# Patient Record
Sex: MALE | Race: WHITE | NOT HISPANIC OR LATINO | Employment: FULL TIME | URBAN - METROPOLITAN AREA
[De-identification: names, ages, dates, MRNs, and addresses within clinical notes are randomized per-mention and may not be internally consistent; named-entity substitution may affect disease eponyms.]

---

## 2017-03-23 ENCOUNTER — OFFICE VISIT - HEALTHEAST (OUTPATIENT)
Dept: FAMILY MEDICINE | Facility: CLINIC | Age: 57
End: 2017-03-23

## 2017-03-23 ENCOUNTER — COMMUNICATION - HEALTHEAST (OUTPATIENT)
Dept: FAMILY MEDICINE | Facility: CLINIC | Age: 57
End: 2017-03-23

## 2017-03-23 DIAGNOSIS — J32.9 RHINOSINUSITIS: ICD-10-CM

## 2017-03-23 ASSESSMENT — MIFFLIN-ST. JEOR: SCORE: 1824.16

## 2018-03-01 ENCOUNTER — OFFICE VISIT - HEALTHEAST (OUTPATIENT)
Dept: FAMILY MEDICINE | Facility: CLINIC | Age: 58
End: 2018-03-01

## 2018-03-01 DIAGNOSIS — Z00.00 ANNUAL PHYSICAL EXAM: ICD-10-CM

## 2018-03-01 DIAGNOSIS — H93.13 TINNITUS OF BOTH EARS: ICD-10-CM

## 2018-03-01 LAB
ALBUMIN SERPL-MCNC: 4.1 G/DL (ref 3.5–5)
ALP SERPL-CCNC: 48 U/L (ref 45–120)
ALT SERPL W P-5'-P-CCNC: 36 U/L (ref 0–45)
ANION GAP SERPL CALCULATED.3IONS-SCNC: 10 MMOL/L (ref 5–18)
AST SERPL W P-5'-P-CCNC: 24 U/L (ref 0–40)
BILIRUB SERPL-MCNC: 1.3 MG/DL (ref 0–1)
BUN SERPL-MCNC: 20 MG/DL (ref 8–22)
CALCIUM SERPL-MCNC: 9.7 MG/DL (ref 8.5–10.5)
CHLORIDE BLD-SCNC: 102 MMOL/L (ref 98–107)
CHOLEST SERPL-MCNC: 192 MG/DL
CO2 SERPL-SCNC: 26 MMOL/L (ref 22–31)
CREAT SERPL-MCNC: 1.07 MG/DL (ref 0.7–1.3)
FASTING STATUS PATIENT QL REPORTED: YES
GFR SERPL CREATININE-BSD FRML MDRD: >60 ML/MIN/1.73M2
GLUCOSE BLD-MCNC: 101 MG/DL (ref 70–125)
HDLC SERPL-MCNC: 54 MG/DL
LDLC SERPL CALC-MCNC: 114 MG/DL
POTASSIUM BLD-SCNC: 4.5 MMOL/L (ref 3.5–5)
PROT SERPL-MCNC: 7.7 G/DL (ref 6–8)
PSA SERPL-MCNC: 2.5 NG/ML (ref 0–3.5)
SODIUM SERPL-SCNC: 138 MMOL/L (ref 136–145)
TRIGL SERPL-MCNC: 119 MG/DL
TSH SERPL DL<=0.005 MIU/L-ACNC: 2.03 UIU/ML (ref 0.3–5)

## 2018-03-01 ASSESSMENT — MIFFLIN-ST. JEOR: SCORE: 1812.37

## 2018-03-02 LAB — 25(OH)D3 SERPL-MCNC: 45.4 NG/ML (ref 30–80)

## 2018-03-05 ENCOUNTER — COMMUNICATION - HEALTHEAST (OUTPATIENT)
Dept: FAMILY MEDICINE | Facility: CLINIC | Age: 58
End: 2018-03-05

## 2018-10-23 ENCOUNTER — COMMUNICATION - HEALTHEAST (OUTPATIENT)
Dept: SCHEDULING | Facility: CLINIC | Age: 58
End: 2018-10-23

## 2018-10-25 ENCOUNTER — OFFICE VISIT - HEALTHEAST (OUTPATIENT)
Dept: FAMILY MEDICINE | Facility: CLINIC | Age: 58
End: 2018-10-25

## 2018-10-25 DIAGNOSIS — R07.9 ACUTE CHEST PAIN: ICD-10-CM

## 2018-10-25 LAB
ALBUMIN SERPL-MCNC: 4.2 G/DL (ref 3.5–5)
ALP SERPL-CCNC: 48 U/L (ref 45–120)
ALT SERPL W P-5'-P-CCNC: 32 U/L (ref 0–45)
ANION GAP SERPL CALCULATED.3IONS-SCNC: 10 MMOL/L (ref 5–18)
AST SERPL W P-5'-P-CCNC: 20 U/L (ref 0–40)
BILIRUB SERPL-MCNC: 1.7 MG/DL (ref 0–1)
BUN SERPL-MCNC: 15 MG/DL (ref 8–22)
CALCIUM SERPL-MCNC: 9.8 MG/DL (ref 8.5–10.5)
CHLORIDE BLD-SCNC: 101 MMOL/L (ref 98–107)
CK-MB: 2 NG/ML (ref 0–7)
CO2 SERPL-SCNC: 27 MMOL/L (ref 22–31)
CREAT SERPL-MCNC: 1.15 MG/DL (ref 0.7–1.3)
D DIMER PPP FEU-MCNC: <=0.27 FEU UG/ML
GFR SERPL CREATININE-BSD FRML MDRD: >60 ML/MIN/1.73M2
GLUCOSE BLD-MCNC: 95 MG/DL (ref 70–125)
POTASSIUM BLD-SCNC: 4 MMOL/L (ref 3.5–5)
PROT SERPL-MCNC: 7.3 G/DL (ref 6–8)
SODIUM SERPL-SCNC: 138 MMOL/L (ref 136–145)
TROPONIN I SERPL-MCNC: <0.01 NG/ML (ref 0–0.29)

## 2018-10-29 ENCOUNTER — COMMUNICATION - HEALTHEAST (OUTPATIENT)
Dept: FAMILY MEDICINE | Facility: CLINIC | Age: 58
End: 2018-10-29

## 2018-10-30 LAB
ATRIAL RATE - MUSE: 75 BPM
DIASTOLIC BLOOD PRESSURE - MUSE: NORMAL MMHG
INTERPRETATION ECG - MUSE: NORMAL
P AXIS - MUSE: 38 DEGREES
PR INTERVAL - MUSE: 172 MS
QRS DURATION - MUSE: 80 MS
QT - MUSE: 360 MS
QTC - MUSE: 402 MS
R AXIS - MUSE: -10 DEGREES
SYSTOLIC BLOOD PRESSURE - MUSE: NORMAL MMHG
T AXIS - MUSE: 21 DEGREES
VENTRICULAR RATE- MUSE: 75 BPM

## 2019-07-03 ENCOUNTER — COMMUNICATION - HEALTHEAST (OUTPATIENT)
Dept: TELEHEALTH | Facility: CLINIC | Age: 59
End: 2019-07-03

## 2019-07-03 ENCOUNTER — OFFICE VISIT - HEALTHEAST (OUTPATIENT)
Dept: FAMILY MEDICINE | Facility: CLINIC | Age: 59
End: 2019-07-03

## 2019-07-03 DIAGNOSIS — M54.42 ACUTE LEFT-SIDED LOW BACK PAIN WITH LEFT-SIDED SCIATICA: ICD-10-CM

## 2019-07-15 ENCOUNTER — RECORDS - HEALTHEAST (OUTPATIENT)
Dept: ADMINISTRATIVE | Facility: OTHER | Age: 59
End: 2019-07-15

## 2019-09-12 ENCOUNTER — RECORDS - HEALTHEAST (OUTPATIENT)
Dept: ADMINISTRATIVE | Facility: OTHER | Age: 59
End: 2019-09-12

## 2019-09-23 ENCOUNTER — RECORDS - HEALTHEAST (OUTPATIENT)
Dept: ADMINISTRATIVE | Facility: OTHER | Age: 59
End: 2019-09-23

## 2019-10-17 ENCOUNTER — RECORDS - HEALTHEAST (OUTPATIENT)
Dept: ADMINISTRATIVE | Facility: OTHER | Age: 59
End: 2019-10-17

## 2019-11-18 ENCOUNTER — RECORDS - HEALTHEAST (OUTPATIENT)
Dept: ADMINISTRATIVE | Facility: OTHER | Age: 59
End: 2019-11-18

## 2019-12-19 ENCOUNTER — RECORDS - HEALTHEAST (OUTPATIENT)
Dept: ADMINISTRATIVE | Facility: OTHER | Age: 59
End: 2019-12-19

## 2020-01-06 ENCOUNTER — OFFICE VISIT - HEALTHEAST (OUTPATIENT)
Dept: FAMILY MEDICINE | Facility: CLINIC | Age: 60
End: 2020-01-06

## 2020-01-06 ENCOUNTER — COMMUNICATION - HEALTHEAST (OUTPATIENT)
Dept: FAMILY MEDICINE | Facility: CLINIC | Age: 60
End: 2020-01-06

## 2020-01-06 DIAGNOSIS — Z00.00 WELLNESS EXAMINATION: ICD-10-CM

## 2020-01-06 DIAGNOSIS — R03.0 ELEVATED BLOOD PRESSURE READING WITHOUT DIAGNOSIS OF HYPERTENSION: ICD-10-CM

## 2020-01-06 LAB
ANION GAP SERPL CALCULATED.3IONS-SCNC: 8 MMOL/L (ref 5–18)
BUN SERPL-MCNC: 17 MG/DL (ref 8–22)
CALCIUM SERPL-MCNC: 9.8 MG/DL (ref 8.5–10.5)
CHLORIDE BLD-SCNC: 106 MMOL/L (ref 98–107)
CHOLEST SERPL-MCNC: 201 MG/DL
CO2 SERPL-SCNC: 27 MMOL/L (ref 22–31)
CREAT SERPL-MCNC: 1.12 MG/DL (ref 0.7–1.3)
ERYTHROCYTE [DISTWIDTH] IN BLOOD BY AUTOMATED COUNT: 11.6 % (ref 11–14.5)
FASTING STATUS PATIENT QL REPORTED: YES
GFR SERPL CREATININE-BSD FRML MDRD: >60 ML/MIN/1.73M2
GLUCOSE BLD-MCNC: 102 MG/DL (ref 70–125)
HBA1C MFR BLD: 5.4 % (ref 3.5–6)
HCT VFR BLD AUTO: 46.1 % (ref 40–54)
HDLC SERPL-MCNC: 52 MG/DL
HGB BLD-MCNC: 15.6 G/DL (ref 14–18)
LDLC SERPL CALC-MCNC: 122 MG/DL
MCH RBC QN AUTO: 31.4 PG (ref 27–34)
MCHC RBC AUTO-ENTMCNC: 33.8 G/DL (ref 32–36)
MCV RBC AUTO: 93 FL (ref 80–100)
PLATELET # BLD AUTO: 223 THOU/UL (ref 140–440)
PMV BLD AUTO: 7.6 FL (ref 7–10)
POTASSIUM BLD-SCNC: 4.4 MMOL/L (ref 3.5–5)
PSA SERPL-MCNC: 2.8 NG/ML (ref 0–3.5)
RBC # BLD AUTO: 4.97 MILL/UL (ref 4.4–6.2)
SODIUM SERPL-SCNC: 141 MMOL/L (ref 136–145)
TRIGL SERPL-MCNC: 137 MG/DL
WBC: 5 THOU/UL (ref 4–11)

## 2020-01-06 ASSESSMENT — MIFFLIN-ST. JEOR: SCORE: 1771.43

## 2020-02-10 ENCOUNTER — COMMUNICATION - HEALTHEAST (OUTPATIENT)
Dept: TELEHEALTH | Facility: CLINIC | Age: 60
End: 2020-02-10

## 2020-02-10 ENCOUNTER — OFFICE VISIT - HEALTHEAST (OUTPATIENT)
Dept: FAMILY MEDICINE | Facility: CLINIC | Age: 60
End: 2020-02-10

## 2020-02-10 DIAGNOSIS — H01.001 BLEPHARITIS OF RIGHT UPPER EYELID, UNSPECIFIED TYPE: ICD-10-CM

## 2020-02-10 RX ORDER — CHLORAL HYDRATE 500 MG
2 CAPSULE ORAL DAILY
Status: SHIPPED | COMMUNITY
Start: 2020-02-10 | End: 2022-11-23

## 2020-02-12 ENCOUNTER — RECORDS - HEALTHEAST (OUTPATIENT)
Dept: ADMINISTRATIVE | Facility: OTHER | Age: 60
End: 2020-02-12

## 2021-02-22 ENCOUNTER — OFFICE VISIT - HEALTHEAST (OUTPATIENT)
Dept: FAMILY MEDICINE | Facility: CLINIC | Age: 61
End: 2021-02-22

## 2021-02-22 ENCOUNTER — TRANSFERRED RECORDS (OUTPATIENT)
Dept: HEALTH INFORMATION MANAGEMENT | Facility: CLINIC | Age: 61
End: 2021-02-22

## 2021-02-22 DIAGNOSIS — R03.0 ELEVATED BLOOD PRESSURE READING WITHOUT DIAGNOSIS OF HYPERTENSION: ICD-10-CM

## 2021-02-22 DIAGNOSIS — R07.9 ACUTE CHEST PAIN: ICD-10-CM

## 2021-02-22 DIAGNOSIS — B02.9 HERPES ZOSTER WITHOUT COMPLICATION: ICD-10-CM

## 2021-02-22 LAB
ATRIAL RATE - MUSE: 70 BPM
DIASTOLIC BLOOD PRESSURE - MUSE: NORMAL
INTERPRETATION ECG - MUSE: NORMAL
P AXIS - MUSE: 33 DEGREES
PR INTERVAL - MUSE: 174 MS
QRS DURATION - MUSE: 82 MS
QT - MUSE: 374 MS
QTC - MUSE: 412 MS
R AXIS - MUSE: -9 DEGREES
SYSTOLIC BLOOD PRESSURE - MUSE: NORMAL
T AXIS - MUSE: 5 DEGREES
VENTRICULAR RATE- MUSE: 73 BPM

## 2021-02-22 RX ORDER — VALACYCLOVIR HYDROCHLORIDE 1 G/1
1000 TABLET, FILM COATED ORAL 3 TIMES DAILY
Qty: 21 TABLET | Refills: 0 | Status: SHIPPED | OUTPATIENT
Start: 2021-02-22 | End: 2021-07-21

## 2021-05-30 VITALS — HEIGHT: 71 IN | WEIGHT: 215.8 LBS | BODY MASS INDEX: 30.21 KG/M2

## 2021-05-30 NOTE — PROGRESS NOTES
"Assessment:   1. Acute left-sided low back pain with left-sided sciatica   Sciatica possibly due to intervertebral disc herniation at L4-5   - Ambulatory referral to Orthopedics     Plan:   Natural history and expected course discussed. Questions answered.  Proper lifting, bending technique discussed.  Stretching exercises discussed.  Orthopedic referral due to possible sciatica.  Follow-up in 4 weeks.     Subjective:   Dashawn Yeung is a 58 y.o. male who presents for evaluation of low back pain. The patient has had no prior back problems. Symptoms have been present for 4 months and are gradually worsening.  Onset was related to / precipitated by a twisting movement. The pain is located in the left lumbar area or left sacroiliac area and radiates to the left hip, left thigh, left lower leg, left foot. The pain is described as aching and occurs all day. He rates his pain as a 3 on a scale of 0-10. Symptoms are exacerbated by extension, straining and twisting. Symptoms are improved by nothing. He has also tried rest which provided no symptom relief. He has no other symptoms associated with the back pain. The patient has no \"red flag\" history indicative of complicated back pain.    The following portions of the patient's history were reviewed and updated as appropriate: allergies, current medications, past family history, past medical history, past social history, past surgical history and problem list.    Review of Systems  Pertinent items are noted in HPI.      Objective:    Full range of motion without pain, no tenderness, no spasm, no curvature.  Normal reflexes, gait, strength and negative straight-leg raise.  Inspection and palpation: inspection of back is normal.  Muscle tone and ROM exam: muscle tone normal without spasm.  Straight leg raise: positive at 30 degrees on the left.  Neurological: right knee reflex normal, left knee reflex increased.       "

## 2021-05-31 VITALS — WEIGHT: 213.9 LBS | HEIGHT: 71 IN | BODY MASS INDEX: 29.94 KG/M2

## 2021-06-02 VITALS — BODY MASS INDEX: 30.06 KG/M2 | WEIGHT: 215.5 LBS

## 2021-06-03 VITALS — BODY MASS INDEX: 29.09 KG/M2 | WEIGHT: 208.56 LBS

## 2021-06-03 VITALS
OXYGEN SATURATION: 97 % | BODY MASS INDEX: 28.56 KG/M2 | HEIGHT: 71 IN | SYSTOLIC BLOOD PRESSURE: 130 MMHG | TEMPERATURE: 97.9 F | DIASTOLIC BLOOD PRESSURE: 100 MMHG | WEIGHT: 204 LBS | HEART RATE: 72 BPM

## 2021-06-04 VITALS
HEART RATE: 75 BPM | SYSTOLIC BLOOD PRESSURE: 122 MMHG | TEMPERATURE: 97.8 F | WEIGHT: 209.1 LBS | BODY MASS INDEX: 28.96 KG/M2 | OXYGEN SATURATION: 98 % | DIASTOLIC BLOOD PRESSURE: 78 MMHG

## 2021-06-04 NOTE — PROGRESS NOTES
Assessment:      Healthy male exam.      Plan:    1. Wellness examination  Encourage exercise, weight loss, baby aspirin, fish oil, updated immunizations, reviewed colonoscopy.  - HM2(CBC w/o Differential)  - Basic Metabolic Panel  - PSA (Prostatic-Specific Antigen), Annual Screen  - Glycosylated Hemoglobin A1c  - Lipid Cascade    2. Elevated blood pressure reading without diagnosis of hypertension  We discussed hypertension in detail.  Recommended weight loss and monitoring.  Offered medications but he would like to hold on this at this time.  He will monitor his blood pressures and if trending above 140/90 we will visit early.    RTC 1 year     All questions answered.     Subjective:      Dashawn Yeung is a 59 y.o. male who presents for an annual exam.  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  He notes that he takes his blood pressure at home and his blood pressure runs in the 148/90 range consistently.  He has past history of back injections in the L5 region.    Healthy Habits:   Regular Exercise: Yes  Sunscreen Use: Yes  Healthy Diet: No  Dental Visits Regularly: Yes  Seat Belt: Yes  Sexually active: Yes  Monthly Self Testicular Exams:  Yes  Hemoccults: No  Flex Sig: No  Colonoscopy: Yes  Lipid Profile: Yes  Glucose Screen: No  Prevention of Osteoporosis: Takes a calcium and a multivitamin.   Last Dexa: No  Guns at Home:  No      Immunization History   Administered Date(s) Administered     Influenza, inj, historic,unspecified 10/21/2019     Immunization status: up to date and documented, due today.    No exam data present    Current Outpatient Medications   Medication Sig Dispense Refill     aspirin 81 MG EC tablet Take 81 mg by mouth daily.       calcium carbonate-vitamin D2 500 mg(1,250mg) -200 unit tablet Take 1 tablet by mouth 2 (two) times a day.       multivitamin therapeutic (THERAGRAN) tablet Take 1 tablet by mouth daily.       No current  facility-administered medications for this visit.      No past medical history on file.  Past Surgical History:   Procedure Laterality Date     APPENDECTOMY       Patient has no known allergies.  Family History   Problem Relation Age of Onset     Stroke Father      Social History     Socioeconomic History     Marital status:      Spouse name: Not on file     Number of children: Not on file     Years of education: Not on file     Highest education level: Not on file   Occupational History     Not on file   Social Needs     Financial resource strain: Not on file     Food insecurity:     Worry: Not on file     Inability: Not on file     Transportation needs:     Medical: Not on file     Non-medical: Not on file   Tobacco Use     Smoking status: Never Smoker     Smokeless tobacco: Never Used   Substance and Sexual Activity     Alcohol use: Yes     Alcohol/week: 7.0 standard drinks     Types: 4 Glasses of wine, 3 Cans of beer per week     Drug use: No     Sexual activity: Yes     Partners: Female     Birth control/protection: None   Lifestyle     Physical activity:     Days per week: Not on file     Minutes per session: Not on file     Stress: Not on file   Relationships     Social connections:     Talks on phone: Not on file     Gets together: Not on file     Attends Hindu service: Not on file     Active member of club or organization: Not on file     Attends meetings of clubs or organizations: Not on file     Relationship status: Not on file     Intimate partner violence:     Fear of current or ex partner: Not on file     Emotionally abused: Not on file     Physically abused: Not on file     Forced sexual activity: Not on file   Other Topics Concern     Not on file   Social History Narrative     Not on file       Review of Systems  General:  Denies problem  Eyes: Denies problem  Ears/Nose/Throat: Denies problem  Cardiovascular: Denies problem  Respiratory:  Denies problem  Gastrointestinal:  Denies  "problem  Genitourinary: Denies problem  Musculoskeletal:  Denies problem  Skin: Denies problem  Neurologic: Denies problem  Psychiatric: Denies problem  Endocrine: Denies problem  Heme/Lymphatic: Denies problem   Allergic/Immunologic: Denies problem        Objective:     Vitals:    01/06/20 0756   BP: (!) 146/96   Pulse: 72   Temp: 97.9  F (36.6  C)   TempSrc: Oral   SpO2: 97%   Weight: 204 lb (92.5 kg)   Height: 5' 11.25\" (1.81 m)     Body mass index is 28.25 kg/m .    Physical  General Appearance: Alert, cooperative, no distress, appears stated age  Head: Normocephalic, without obvious abnormality, atraumatic  Eyes: PERRL, conjunctiva/corneas clear, EOM's intact  Ears: Normal TM's and external ear canals, both ears  Nose: Nares normal, septum midline,mucosa normal, no drainage  Throat: Lips, mucosa, and tongue normal; teeth and gums normal  Neck: Supple, symmetrical, trachea midline, no adenopathy;  thyroid: not enlarged, symmetric, no tenderness/mass/nodules; no carotid bruit or JVD  Back: Symmetric, no curvature, ROM normal, no CVA tenderness  Lungs: Clear to auscultation bilaterally, respirations unlabored  Heart: Regular rate and rhythm, S1 and S2 normal, no murmur, rub, or gallop,  Abdomen: Soft, non-tender, bowel sounds active all four quadrants,  no masses, no organomegaly  Genitourinary: Penis normal. Right testis is descended. Left testis is descended.   Musculoskeletal: Normal range of motion. No joint swelling or deformity.   Extremities: Extremities normal, atraumatic, no cyanosis or edema  Skin: Skin color, texture, turgor normal, no rashes or lesions  Lymph nodes: Cervical, supraclavicular, and axillary nodes normal  Neurologic: He is alert. He has normal reflexes.   Psychiatric: He has a normal mood and affect.            "

## 2021-06-05 VITALS
HEART RATE: 75 BPM | OXYGEN SATURATION: 98 % | WEIGHT: 207 LBS | BODY MASS INDEX: 28.67 KG/M2 | DIASTOLIC BLOOD PRESSURE: 80 MMHG | SYSTOLIC BLOOD PRESSURE: 142 MMHG

## 2021-06-09 NOTE — PROGRESS NOTES
ASSESSMENT:  1. Rhinosinusitis  - azithromycin (ZITHROMAX Z-TEE) 250 MG tablet; Take 2 tablets (500 mg) on  Day 1,  followed by 1 tablet (250 mg) once daily on Days 2 through 5.  Dispense: 6 tablet; Refill: 0  - bacitracin-neomycin-polymyxin b-hydrocortisone (CORTISPORIN) 1 % ointment; Apply topically 2 (two) times a day.  Dispense: 15 g; Refill: 0    PLAN:  Patient Instructions   Take two pills of the Z-pack today and for the remaining days take one. Perform nasal rinses, and take around 10 mg of anti-histamines like Claritin. Nasal saline is available over the counter. Take ibuprofen for pain as needed. Also if your glasses continue to bother you, try the cotton balls.       No orders of the defined types were placed in this encounter.    There are no discontinued medications.    No Follow-up on file.    CHIEF COMPLAINT:  Chief Complaint   Patient presents with     [facility] [place]     [place]     swollen lymph node-sinus congestion-painful ear       HISTORY OF PRESENT ILLNESS:  Dashawn is a 56 y.o. male presenting to the clinic today to establish care.     Ear Pain: His wife shaves his head once a week fairly regularly, and last week after his hair was shaved he noticed what looked like razor burn. Around the same time the left side of his head near his ear appears swollen. He first noticed it when his glasses began to bother him and squeezing the side of his head. He has also been suffering from  Mild cold symptoms. His throat hurts a little and the top of his head is sensitive. He travels a lot and is on a plane at least once a week. His head does not feel full or heavy.     Healthy Habits:   Regular Exercise: Yes  Sunscreen Use: Yes  Healthy Diet: Yes  Dental Visits Regularly: Yes  Seat Belt: Yes  Sexually active: Yes  Monthly Self Testicular Exams:  N/A  Hemoccults: N/A  Flex Sig: N/A  Colonoscopy: N/A  Lipid Profile: N/A  Glucose Screen: N/A  Prevention of Osteoporosis: N/A  Last Dexa: N/A  Guns at Home:   "N/A    There is no immunization history on file for this patient.  Immunization status: unknown status, parent to bring shot records.    REVIEW OF SYSTEMS:   He does not have any allergies to medications. He denies fevers, chills, night sweats, and nausea. He denies dizziness. All other systems are negative.    PFSH:  He had his appendix removed. His mother  at 90 possibly from a stroke. His father passed away from a stroke. He has 8 sisters and 2 brothers all alive and healthy. Two children, a son and a daughter, and one grandchild. He is a non-smoker. He drinks beer and wine, about 4-5 glasses of wine a week and 2-3 beers a week. He denies use of street drugs. 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     History   Smoking Status     Never Smoker   Smokeless Tobacco     Not on file       Family History   Problem Relation Age of Onset     Stroke Father        Social History     Social History     Marital status:      Spouse name: N/A     Number of children: N/A     Years of education: N/A     Occupational History     Not on file.     Social History Main Topics     Smoking status: Never Smoker     Smokeless tobacco: Not on file     Alcohol use 4.2 oz/week     4 Glasses of wine, 3 Cans of beer per week     Drug use: No     Sexual activity: Yes     Partners: Female     Birth control/ protection: None     Other Topics Concern     Not on file     Social History Narrative     No narrative on file       Past Surgical History:   Procedure Laterality Date     APPENDECTOMY         No Known Allergies    Active Ambulatory Problems     Diagnosis Date Noted     No Active Ambulatory Problems     Resolved Ambulatory Problems     Diagnosis Date Noted     No Resolved Ambulatory Problems     No Additional Past Medical History       VITALS:  Vitals:    17 1349   BP: 134/78   Pulse: 78   SpO2: 98%   Weight: 215 lb 12.8 oz (97.9 kg)   Height: 5' 11.2\" (1.808 m)     Wt Readings from Last 3 Encounters:   17 " 215 lb 12.8 oz (97.9 kg)     Body mass index is 29.93 kg/(m^2).    PHYSICAL EXAM:  Physical Examination: General appearance - alert, well appearing, and in no distress  Head: Normocephalic, without obvious abnormality, atraumatic   Ears: Mild cerumen in the left, erythematous external ears, warm to touch, mildly swollen left mastoid   Nose: inflamed nasal turbinates of left nostril with purulent-brown drainage  Mouth: mucous membranes moist, pharynx normal without lesions, cobblestoning  Neurological: alert, oriented, normal speech, no focal findings or movement disorder noted  Psychiatric: Normal affect. Does not appear anxious or depressed.    ADDITIONAL HISTORY SUMMARIZED (2): None.  DECISION TO OBTAIN EXTRA INFORMATION (1): None.   RADIOLOGY TESTS (1): None.  LABS (1): None.  MEDICINE TESTS (1): None.  INDEPENDENT REVIEW (2 each): None.     The visit lasted a total of 20 minutes face to face with the patient. Over 50% of the time was spent counseling and educating the patient about ear pain.    IMaia, am scribing for and in the presence of, Promise Amajiri, FNP.    INatasha FNP personally performed the services described in this documentation, as scribed by Maia Freedman in my presence, and it is both accurate and complete.    MEDICATIONS:  Current Outpatient Prescriptions   Medication Sig Dispense Refill     aspirin 81 MG EC tablet Take 81 mg by mouth daily.       calcium carbonate-vitamin D2 500 mg(1,250mg) -200 unit tablet Take 1 tablet by mouth 2 (two) times a day.       multivitamin therapeutic (THERAGRAN) tablet Take 1 tablet by mouth daily.       azithromycin (ZITHROMAX Z-TEE) 250 MG tablet Take 2 tablets (500 mg) on  Day 1,  followed by 1 tablet (250 mg) once daily on Days 2 through 5. 6 tablet 0     bacitracin-neomycin-polymyxin b-hydrocortisone (CORTISPORIN) 1 % ointment Apply topically 2 (two) times a day. 15 g 0     No current facility-administered medications for this  visit.        Total data points: 0

## 2021-06-15 NOTE — PROGRESS NOTES
Antione,  How is your rash and pain? The EKG final report did not show significant change from 2 years ago.  At some point in the next month should have follow-up on your blood pressure.    Dr. Feliciano

## 2021-06-15 NOTE — PROGRESS NOTES
ASSESSMENT/PLAN:       1. Acute chest pain  The chest pain seems atypical for angina and the EKG shows no acute changes.  Does have voltage criteria for LVH.  Occasional PVC which has been present before.    - Electrocardiogram Perform and Read    2. Herpes zoster without complication  The rash looks suggestive of early herpes zoster and no lesions yet that look vesicular so did not do a swab to send to the lab.  I think we should treat empirically as if it is herpes zoster which certainly would explain the discomfort he is experiencing on the left side just below the rib cage.  For the rash he can certainly use calamine lotion to help with symptoms such as the itching  Told him that the discomfort can last for 2 weeks and the rash may actually get worse before it starts getting better  Caution with not spreading the varicella virus to his eyes or to other people    - valACYclovir (VALTREX) 1000 MG tablet; Take 1 tablet (1,000 mg total) by mouth 3 (three) times a day.  Dispense: 21 tablet; Refill: 0    3. Elevated blood pressure reading without diagnosis of hypertension  The patient should monitor his blood pressure and if it continues to be in the 140s systolic or at 90 or above diastolic he needs to follow-up as his blood pressure may need to be treated with medication.  Positive family history for hypertension    Office visit E/M total time 31 minutes  26 minutes face-to-face with the patient and an additional 5 minutes same day previsit and post visit review of EKG from 2018 along with previous labs from 2020 in 2018.      Zander Feliciano MD      PROGRESS NOTE   2/23/2021    SUBJECTIVE:  Dashawn Yeung is a 60 y.o. male  who presents for   Chief Complaint   Patient presents with     Abdominal Pain     Under left rib and up into chest area.     3-day history of some tightness in the left upper chest laterally and anterior.  Also has had pain just under the rib cage on the left side and in the back area thoracic  region left side.  The discomfort in the chest is rated as 2-3 out of 10 and is somewhat hard to localize and does not seem to radiate.  No associated shortness of breath, nausea vomiting diaphoresis and appetite has been good.  When he exerts himself such as vacuuming going up and down the hill to slide shoveling snow has not had any worsening of the chest pain and no shortness of breath.  Bowel movements and urination has been normal  Note that blood pressure is mildly elevated today and there is a positive family history for hypertension in his mother and father.  Father  of a stroke at age 72 and mother  at age 90.    There is no problem list on file for this patient.      Current Outpatient Medications   Medication Sig Dispense Refill     aspirin 81 MG EC tablet Take 81 mg by mouth daily.       calcium carbonate-vitamin D2 500 mg(1,250mg) -200 unit tablet Take 1 tablet by mouth 2 (two) times a day.       fish oil-omega-3 fatty acids 300-1,000 mg capsule Take 2 g by mouth daily.       multivitamin therapeutic (THERAGRAN) tablet Take 1 tablet by mouth daily.       valACYclovir (VALTREX) 1000 MG tablet Take 1 tablet (1,000 mg total) by mouth 3 (three) times a day. 21 tablet 0     No current facility-administered medications for this visit.        Social History     Tobacco Use   Smoking Status Never Smoker   Smokeless Tobacco Never Used           OBJECTIVE:        Recent Results (from the past 240 hour(s))   Electrocardiogram Perform and Read   Result Value Ref Range    SYSTOLIC BLOOD PRESSURE      DIASTOLIC BLOOD PRESSURE      VENTRICULAR RATE 73 BPM    ATRIAL RATE 70 BPM    P-R INTERVAL 174 ms    QRS DURATION 82 ms    Q-T INTERVAL 374 ms    QTC CALCULATION (BEZET) 412 ms    P Axis 33 degrees    R AXIS -9 degrees    T AXIS 5 degrees    MUSE DIAGNOSIS       Sinus rhythm with sinus arrhythmia with occasional Premature ventricular complexes  Moderate voltage criteria for LVH, may be normal  variant  Borderline ECG  When compared with ECG of 25-OCT-2018 13:38,  No significant change was found  Confirmed by LIZETT MANZANARES MD LOC:[facility] ([id number]) on 2/22/2021 3:29:15 PM         Vitals:    02/22/21 1318 02/22/21 1341   BP: 168/76 142/80   Patient Site:  Left Arm   Patient Position:  Sitting   Cuff Size:  Adult Large   Pulse: 75    SpO2: 98%    Weight: 207 lb (93.9 kg)      Weight: 207 lb (93.9 kg)          Physical Exam:  GENERAL APPEARANCE: 60-year-old male, NAD, well hydrated, well nourished  SKIN:  Normal skin turgor, there is a rash on his back at about that T8-T9 level left side erythematous base with small raised papules at this point no pustules or vesicles.  There are 2 or 3 other red papules with an erythematous base along the same dermatome closer to the posterior axillary line.  HEENT: moist mucous membranes, no rhinorrhea  NECK: Normal without adenopathy or masses  CV: RRR, no M/G/R, no chest wall tenderness to palpation and has a slightly irregular heart rate with pauses  LUNGS: CTAB  ABDOMEN: S&NT, no masses or enlarged organs   EXTREMITY: no edema and full ROM of all joints  NEURO: no focal findings

## 2021-06-16 NOTE — PROGRESS NOTES
Assessment:   1. Annual physical exam   Healthy male exam.  - Lipid Cascade FASTING  - Vitamin D, Total (25-Hydroxy)  - PSA, Annual Screen (Prostatic-Specific Antigen)  - Comprehensive Metabolic Panel  - Thyroid Stimulating Hormone (TSH)    2. Tinnitus of both ears  Positive mild fluid in the left inner ear. No evidence of sensorineural hearing loss, vascular insufficiency, bone disease, infection, metabolic disorder, autoimmune disease, ocular injury.  Patient also had no drug that causes all exacerbate tinnitus. Trial of nasal steroid and antihistamine    - fluticasone (FLONASE ALLERGY RELIEF) 50 mcg/actuation nasal spray; 1 spray into each nostril daily.  Dispense: 16 g; Refill: 0  - loratadine (CLARITIN) 10 mg tablet; Take 1 tablet (10 mg total) by mouth daily.  Dispense: 30 tablet; Refill: 2     Plan:   All questions answered.  Blood tests: Comprehensive metabolic panel, PSA, screening, Total cholesterol and TSH.  Diagnosis explained in detail, including differential.  Discussed healthy lifestyle modifications.  Follow up as needed.     Subjective:   Dashawn Yeung is a 57 y.o. male who presents for an annual exam. The patient reports that there is not domestic violence in his life. Patient reports that 3 months ago he had a possible viral infection where he had head cold and it did last for a while and soon after this she started having ringing in his ear is and since then it has not gone away.  He denied dizziness, shortness of breath, fever, chills, nausea, chest pain, and syncope.  He has not taken any medication for his current symptoms.  He denied hearing loss, accident to his ears or head, exposure to loud sounds or tractors.    Healthy Habits:   Regular Exercise: Yes  Sunscreen Use: Yes  Healthy Diet: Yes  Dental Visits Regularly: Yes  Seat Belt: Yes  Sexually active: Yes  Monthly Self Testicular Exams:  No  Hemoccults: No  Flex Sig: no  Colonoscopy: Yes  Age 52  Negative  Lipid Profile: Yes  Glucose  Screen: No  Prevention of Osteoporosis: No  Last Dexa: No  Guns at Home:  No        There is no immunization history on file for this patient.  Immunization status: up to date and documented, stated as current, but no records available.    No exam data present    Current Outpatient Prescriptions   Medication Sig Dispense Refill     aspirin 81 MG EC tablet Take 81 mg by mouth daily.       calcium carbonate-vitamin D2 500 mg(1,250mg) -200 unit tablet Take 1 tablet by mouth 2 (two) times a day.       multivitamin therapeutic (THERAGRAN) tablet Take 1 tablet by mouth daily.       bacitracin-neomycin-polymyxin b-hydrocortisone (CORTISPORIN) 1 % ointment Apply topically 2 (two) times a day. 15 g 0     No current facility-administered medications for this visit.      No past medical history on file.  Past Surgical History:   Procedure Laterality Date     APPENDECTOMY       Review of patient's allergies indicates no known allergies.  Family History   Problem Relation Age of Onset     Stroke Father      Social History     Social History     Marital status:      Spouse name: N/A     Number of children: N/A     Years of education: N/A     Occupational History     Not on file.     Social History Main Topics     Smoking status: Never Smoker     Smokeless tobacco: Never Used     Alcohol use 4.2 oz/week     4 Glasses of wine, 3 Cans of beer per week     Drug use: No     Sexual activity: Yes     Partners: Female     Birth control/ protection: None     Other Topics Concern     Not on file     Social History Narrative       Review of Systems  General:  Denies problem  Eyes: Denies problem  Ears/Nose/Throat: Denies problem  Cardiovascular: Denies problem  Respiratory:  Denies problem  Gastrointestinal:  Denies problem  Genitourinary: Denies problem  Musculoskeletal:  Denies problem  Skin: Denies problem  Neurologic: Denies problem  Psychiatric: Denies problem  Endocrine: Denies problem  Heme/Lymphatic: Denies problem  "  Allergic/Immunologic: Denies problem        Objective:     Vitals:    03/01/18 0740   BP: 138/90   Pulse: 82   SpO2: 96%   Weight: 213 lb 14.4 oz (97 kg)   Height: 5' 11\" (1.803 m)     Body mass index is 29.83 kg/(m^2).    Physical  General Appearance: Alert, cooperative, no distress, appears stated age  Head: Normocephalic, without obvious abnormality, atraumatic  Eyes: PERRL, conjunctiva/corneas clear, EOM's intact  Ears: Normal TM's and external ear canals, both ears  Nose: Nares normal, septum midline,mucosa normal, no drainage  Throat: Lips, mucosa, and tongue normal; teeth and gums normal  Neck: Supple, symmetrical, trachea midline, no adenopathy;  thyroid: not enlarged, symmetric, no tenderness/mass/nodules; no carotid bruit or JVD  Back: Symmetric, no curvature, ROM normal, no CVA tenderness  Lungs: Clear to auscultation bilaterally, respirations unlabored  Heart: Regular rate and rhythm, S1 and S2 normal, no murmur, rub, or gallop,  Abdomen: Soft, non-tender, bowel sounds active all four quadrants,  no masses, no organomegaly  Musculoskeletal: Normal range of motion. No joint swelling or deformity.   Extremities: Extremities normal, atraumatic, no cyanosis or edema  Skin: Skin color, texture, turgor normal, no rashes or lesions  Lymph nodes: Cervical, supraclavicular, and axillary nodes normal  Neurologic: He is alert. He has normal reflexes.   Psychiatric: He has a normal mood and affect.            "

## 2021-06-20 NOTE — LETTER
Letter by Genoveva Alex MD at      Author: Genoveva Alex MD Service: -- Author Type: --    Filed:  Encounter Date: 1/6/2020 Status: Signed         Dashawn Yeung  [address] [place] [id number]            January 6, 2020        Dear Mr. Yeung,        Below are the results from your recent visit:    Resulted Orders   HM2(CBC w/o Differential)   Result Value Ref Range    WBC 5.0 4.0 - 11.0 thou/uL    RBC 4.97 4.40 - 6.20 mill/uL    Hemoglobin 15.6 14.0 - 18.0 g/dL    Hematocrit 46.1 40.0 - 54.0 %    MCV 93 80 - 100 fL    MCH 31.4 27.0 - 34.0 pg    MCHC 33.8 32.0 - 36.0 g/dL    RDW 11.6 11.0 - 14.5 %    Platelets 223 140 - 440 thou/uL    MPV 7.6 7.0 - 10.0 fL   Basic Metabolic Panel   Result Value Ref Range    Sodium 141 136 - 145 mmol/L    Potassium 4.4 3.5 - 5.0 mmol/L    Chloride 106 98 - 107 mmol/L    CO2 27 22 - 31 mmol/L    Anion Gap, Calculation 8 5 - 18 mmol/L    Glucose 102 70 - 125 mg/dL    Calcium 9.8 8.5 - 10.5 mg/dL    BUN 17 8 - 22 mg/dL    Creatinine 1.12 0.70 - 1.30 mg/dL    GFR MDRD Af Amer >60 >60 mL/min/1.73m2    GFR MDRD Non Af Amer >60 >60 mL/min/1.73m2    Narrative    Fasting Glucose reference range is 70-99 mg/dL per  American Diabetes Association (ADA) guidelines.   PSA (Prostatic-Specific Antigen), Annual Screen   Result Value Ref Range    PSA 2.8 0.0 - 3.5 ng/mL    Narrative    Method is Abbott Prostate-Specific Antigen (PSA)  Standard-WHO 1st International (90:10)   Glycosylated Hemoglobin A1c   Result Value Ref Range    Hemoglobin A1c 5.4 3.5 - 6.0 %   Lipid Cascade   Result Value Ref Range    Cholesterol 201 (H) <=199 mg/dL    Triglycerides 137 <=149 mg/dL    HDL Cholesterol 52 >=40 mg/dL    LDL Calculated 122 <=129 mg/dL    Patient Fasting > 8hrs? Yes          Antione, your laboratory results are normal.  That is good news.  Thank you for coming in to visit.  We should visit again in 1 year.      Sincerely,        LONNIE Alex MD, BERNADETTE Richardson  [facility]  [phone]

## 2021-06-21 NOTE — PROGRESS NOTES
Assessment:   1. Acute chest pain   Chest pain, suspected etiology: chest wall pain. Recommend ruling our cardiac etiology   - Electrocardiogram Perform and Read  - Troponin I  - CK MB  - Comprehensive Metabolic Panel  - D-dimer, Quantitative       Plan:   Patient history and exam consistent with non-cardiac cause of chest pain.  Conservative measures indicated.  OTC analgesics as needed.  Worsening signs and symptoms discussed and patient verbalized understanding.  Follow up with me in 7 week.     Subjective:   Dashawn Yeung is a 57 y.o. male who presents for evaluation of chest pain. Onset was 6 days ago. Symptoms have improved since that time. The patient describes the pain as sharp and radiates to the right shoulder. Patient rates pain as a 4/10 in intensity. Associated symptoms are: none. Aggravating factors are: deep inspiration and palpation of chest. Alleviating factors are: none. Patient's cardiac risk factors are: advanced age (older than 55 for men, 65 for women) and male gender. Patient's risk factors for DVT/PE: none. Previous cardiac testing: HDL and LDL.    The following portions of the patient's history were reviewed and updated as appropriate: allergies, current medications, past family history, past medical history, past social history, past surgical history and problem list.    Review of Systems  A 12 point comprehensive review of systems was negative except as noted.      Objective:      /84 (Patient Site: Right Arm, Patient Position: Sitting, Cuff Size: Adult Regular)  Pulse 65  Wt 215 lb 8 oz (97.8 kg)  SpO2 98%  BMI 30.06 kg/m2  General appearance: alert, appears stated age and cooperative  Head: Normocephalic, without obvious abnormality, atraumatic  Eyes: conjunctivae/corneas clear. PERRL, EOM's intact. Fundi benign.  Ears: normal TM's and external ear canals both ears  Throat: lips, mucosa, and tongue normal; teeth and gums normal  Neck: no adenopathy, no carotid bruit, no JVD,  supple, symmetrical, trachea midline and thyroid not enlarged, symmetric, no tenderness/mass/nodules  Lungs: clear to auscultation bilaterally  Chest wall: no tenderness, right sided costochondral tenderness  Heart: regular rate and rhythm, S1, S2 normal, no murmur, click, rub or gallop  Pulses: 2+ and symmetric  Skin: Skin color, texture, turgor normal. No rashes or lesions  Lymph nodes: Cervical, supraclavicular, and axillary nodes normal.  Neurologic: Grossly normal    Cardiographics  ECG: Sinus rhythm with sinus arrhythmia with occasional Premature ventricular complexes   Voltage criteria for left ventricular hypertrophy   Abnormal ECG     Imaging  Chest x-ray: not indicated

## 2021-06-27 ENCOUNTER — HEALTH MAINTENANCE LETTER (OUTPATIENT)
Age: 61
End: 2021-06-27

## 2021-07-03 ENCOUNTER — RECORDS - HEALTHEAST (OUTPATIENT)
Dept: ADMINISTRATIVE | Facility: OTHER | Age: 61
End: 2021-07-03

## 2021-07-03 NOTE — ADDENDUM NOTE
Addendum Note by Genoveva Alex MD at 1/6/2020  8:00 AM     Author: Genoveva Alex MD Service: -- Author Type: Physician    Filed: 1/7/2020  3:51 PM Encounter Date: 1/6/2020 Status: Signed    : Genoveva Alex MD (Physician)    Addended by: GENOVEVA ALEX on: 1/7/2020 03:51 PM        Modules accepted: Orders

## 2021-07-15 ENCOUNTER — TRANSFERRED RECORDS (OUTPATIENT)
Dept: HEALTH INFORMATION MANAGEMENT | Facility: CLINIC | Age: 61
End: 2021-07-15

## 2021-07-21 ENCOUNTER — OFFICE VISIT (OUTPATIENT)
Dept: FAMILY MEDICINE | Facility: CLINIC | Age: 61
End: 2021-07-21
Payer: COMMERCIAL

## 2021-07-21 VITALS
HEART RATE: 56 BPM | OXYGEN SATURATION: 97 % | RESPIRATION RATE: 18 BRPM | SYSTOLIC BLOOD PRESSURE: 120 MMHG | WEIGHT: 206 LBS | DIASTOLIC BLOOD PRESSURE: 78 MMHG | HEIGHT: 72 IN | BODY MASS INDEX: 27.9 KG/M2 | TEMPERATURE: 98 F

## 2021-07-21 DIAGNOSIS — Z01.818 PREOP GENERAL PHYSICAL EXAM: Primary | ICD-10-CM

## 2021-07-21 DIAGNOSIS — M23.91 INTERNAL DERANGEMENT OF RIGHT KNEE: ICD-10-CM

## 2021-07-21 PROCEDURE — 99214 OFFICE O/P EST MOD 30 MIN: CPT | Performed by: FAMILY MEDICINE

## 2021-07-21 ASSESSMENT — MIFFLIN-ST. JEOR: SCORE: 1788.44

## 2021-07-21 NOTE — PATIENT INSTRUCTIONS

## 2021-07-21 NOTE — PROGRESS NOTES
[facility]  [address],   [place] [facility] [address]  Phone: [phone]  Fax: [phone]  Primary Provider: Zander Feliciano  Pre-op Performing Provider: BOBO BORREGO    PREOPERATIVE EVALUATION:  Today's date: 7/21/2021    Dashawn Yeung is a 60 year old male who presents for a preoperative evaluation.    Surgical Information:  Surgery/Procedure: Torn meniscus repair right side  Surgery Location: [facility]  Surgeon: Dr. Negro  Surgery Date: 7/29/21  Time of Surgery: TBD  Where patient plans to recover: At home with family  Fax number for surgical facility: [phone]    Type of Anesthesia Anticipated: Local with MAC    Assessment & Plan     The proposed surgical procedure is considered LOW risk.    Preop general physical exam     Internal derangement of right knee    Medication Instructions:  HOLD fish oil and multivitamin until after procedure.    - aspirin: Discontinue aspirin 7-10 days prior to procedure to reduce bleeding risk. It should be resumed postoperatively.     RECOMMENDATION:  APPROVAL GIVEN to proceed with proposed procedure, without further diagnostic evaluation.     Subjective     HPI related to upcoming procedure: starting in mid-June, began having increased pain when trying to put weight on it after sitting.  Has noted some periods of improvement since then but on 7/3/21 was trying to job on it, and heard/felt a pop.      Preop Questions 7/21/2021   1. Have you ever had a heart attack or stroke? No   2. Have you ever had surgery on your heart or blood vessels, such as a stent placement, a coronary artery bypass, or surgery on an artery in your head, neck, heart, or legs? No   3. Do you have chest pain with activity? No   4. Do you have a history of  heart failure? No   5. Do you currently have a cold, bronchitis or symptoms of other infection? No   6. Do you have a cough, shortness of breath, or  wheezing? No   7. Do you or anyone in your family have previous history of blood clots? YES - sister recently diagnosed with pulmonary embolism   8. Do you or does anyone in your family have a serious bleeding problem such as prolonged bleeding following surgeries or cuts? No   9. Have you ever had problems with anemia or been told to take iron pills? No   10. Have you had any abnormal blood loss such as black, tarry or bloody stools? No   11. Have you ever had a blood transfusion? No   12. Are you willing to have a blood transfusion if it is medically needed before, during, or after your surgery? Yes   13. Have you or any of your relatives ever had problems with anesthesia? No   14. Do you have sleep apnea, excessive snoring or daytime drowsiness? No   15. Do you have any artifical heart valves or other implanted medical devices like a pacemaker, defibrillator, or continuous glucose monitor? No   16. Do you have artificial joints? No   17. Are you allergic to latex? No       Health Care Directive:  Patient does not have a Health Care Directive or Living Will: Patient states has Advance Directive and will bring in a copy to clinic.    Preoperative Review of :   reviewed - no record of controlled substances prescribed.     Review of Systems  CONSTITUTIONAL: NEGATIVE for fever, chills, change in weight  EYES: NEGATIVE for vision changes or irritation  ENT/MOUTH: NEGATIVE for ear, mouth and throat problems  RESP: NEGATIVE for significant cough or SOB  CV: NEGATIVE for chest pain, palpitations or peripheral edema  GI: NEGATIVE for nausea, abdominal pain, heartburn, or change in bowel habits  : NEGATIVE for frequency, dysuria, or hematuria  MUSCULOSKELETAL:see HPI   NEURO: NEGATIVE for weakness, dizziness or paresthesias  ENDOCRINE: NEGATIVE for temperature intolerance, skin/hair changes  HEME: NEGATIVE for bleeding problems  PSYCHIATRIC: NEGATIVE for changes in mood or affect    There are no problems to display  "for this patient.     No past medical history on file.  Past Surgical History:   Procedure Laterality Date     APPENDECTOMY       Current Outpatient Medications   Medication Sig Dispense Refill     aspirin 81 MG EC tablet [ASPIRIN 81 MG EC TABLET] Take 81 mg by mouth daily.       calcium carbonate-vitamin D2 500 mg(1,250mg) -200 unit tablet [CALCIUM CARBONATE-VITAMIN D2 500 MG(1,250MG) -200 UNIT TABLET] Take 1 tablet by mouth 2 (two) times a day.       fish oil-omega-3 fatty acids 300-1,000 mg capsule [FISH OIL-OMEGA-3 FATTY ACIDS 300-1,000 MG CAPSULE] Take 2 g by mouth daily.       multivitamin therapeutic (THERAGRAN) tablet [MULTIVITAMIN THERAPEUTIC (THERAGRAN) TABLET] Take 1 tablet by mouth daily.         No Known Allergies     Social History     Tobacco Use     Smoking status: Never Smoker     Smokeless tobacco: Never Used   Substance Use Topics     Alcohol use: Yes     Alcohol/week: 7.0 standard drinks     History   Drug Use No         Objective     /78   Pulse 56   Temp 98  F (36.7  C) (Oral)   Resp 18   Ht 1.822 m (5' 11.75\")   Wt 93.4 kg (206 lb)   SpO2 97%   BMI 28.13 kg/m      Physical Exam  GENERAL APPEARANCE: healthy, alert and no distress  HENT: ear canals and TM's normal and nose and mouth without ulcers or lesions  RESP: lungs clear to auscultation - no rales, rhonchi or wheezes  CV: regular rate and rhythm, normal S1 S2, no S3 or S4 and no murmur, click or rub   ABDOMEN: soft, nontender, no HSM or masses and bowel sounds normal  NEURO:   mentation intact and speech normal    Recent Labs   Lab Test 01/06/20  0845   HGB 15.6         POTASSIUM 4.4   CR 1.12   A1C 5.4        Diagnostics:  No labs were ordered during this visit.   No EKG required, no history of coronary heart disease, significant arrhythmia, peripheral arterial disease or other structural heart disease.   EKG 2/22/21:   Sinus rhythm with sinus arrhythmia with occasional Premature ventricular complexes   Moderate " voltage criteria for LVH, may be normal variant   Borderline ECG   When compared with ECG of 25-OCT-2018 13:38,   No significant change was found   Confirmed by LIZETT MANZANARES MD    Revised Cardiac Risk Index (RCRI):  The patient has the following serious cardiovascular risks for perioperative complications:   - No serious cardiac risks = 0 points     RCRI Interpretation: 0 points: Class I (very low risk - 0.4% complication rate)       Signed Electronically by: Ayaz Lemus MD  Copy of this evaluation report is provided to requesting physician.

## 2021-08-12 ENCOUNTER — TRANSFERRED RECORDS (OUTPATIENT)
Dept: HEALTH INFORMATION MANAGEMENT | Facility: CLINIC | Age: 61
End: 2021-08-12

## 2021-09-09 ENCOUNTER — TRANSFERRED RECORDS (OUTPATIENT)
Dept: HEALTH INFORMATION MANAGEMENT | Facility: CLINIC | Age: 61
End: 2021-09-09

## 2021-09-20 ENCOUNTER — OFFICE VISIT (OUTPATIENT)
Dept: FAMILY MEDICINE | Facility: CLINIC | Age: 61
End: 2021-09-20
Payer: COMMERCIAL

## 2021-09-20 VITALS
HEART RATE: 80 BPM | SYSTOLIC BLOOD PRESSURE: 120 MMHG | OXYGEN SATURATION: 96 % | HEIGHT: 72 IN | DIASTOLIC BLOOD PRESSURE: 80 MMHG | WEIGHT: 205.2 LBS | BODY MASS INDEX: 27.79 KG/M2

## 2021-09-20 DIAGNOSIS — R03.0 ELEVATED BLOOD PRESSURE READING WITHOUT DIAGNOSIS OF HYPERTENSION: Primary | ICD-10-CM

## 2021-09-20 DIAGNOSIS — Z13.1 SCREENING FOR DIABETES MELLITUS: ICD-10-CM

## 2021-09-20 DIAGNOSIS — Z00.00 HEALTH CARE MAINTENANCE: ICD-10-CM

## 2021-09-20 DIAGNOSIS — Z13.220 LIPID SCREENING: ICD-10-CM

## 2021-09-20 DIAGNOSIS — Z23 NEED FOR SHINGLES VACCINE: ICD-10-CM

## 2021-09-20 LAB
ALBUMIN SERPL-MCNC: 4.2 G/DL (ref 3.5–5)
ALP SERPL-CCNC: 49 U/L (ref 45–120)
ALT SERPL W P-5'-P-CCNC: 28 U/L (ref 0–45)
ANION GAP SERPL CALCULATED.3IONS-SCNC: 9 MMOL/L (ref 5–18)
AST SERPL W P-5'-P-CCNC: 21 U/L (ref 0–40)
BILIRUB SERPL-MCNC: 2.7 MG/DL (ref 0–1)
BUN SERPL-MCNC: 17 MG/DL (ref 8–22)
CALCIUM SERPL-MCNC: 9.9 MG/DL (ref 8.5–10.5)
CHLORIDE BLD-SCNC: 104 MMOL/L (ref 98–107)
CHOLEST SERPL-MCNC: 169 MG/DL
CO2 SERPL-SCNC: 27 MMOL/L (ref 22–31)
CREAT SERPL-MCNC: 1.12 MG/DL (ref 0.7–1.3)
FASTING STATUS PATIENT QL REPORTED: YES
GFR SERPL CREATININE-BSD FRML MDRD: 72 ML/MIN/1.73M2
GLUCOSE BLD-MCNC: 100 MG/DL (ref 70–125)
HDLC SERPL-MCNC: 52 MG/DL
LDLC SERPL CALC-MCNC: 94 MG/DL
POTASSIUM BLD-SCNC: 4.6 MMOL/L (ref 3.5–5)
PROT SERPL-MCNC: 7.3 G/DL (ref 6–8)
SODIUM SERPL-SCNC: 140 MMOL/L (ref 136–145)
TRIGL SERPL-MCNC: 114 MG/DL

## 2021-09-20 PROCEDURE — 80061 LIPID PANEL: CPT | Performed by: FAMILY MEDICINE

## 2021-09-20 PROCEDURE — 36415 COLL VENOUS BLD VENIPUNCTURE: CPT | Performed by: FAMILY MEDICINE

## 2021-09-20 PROCEDURE — 99214 OFFICE O/P EST MOD 30 MIN: CPT | Performed by: FAMILY MEDICINE

## 2021-09-20 PROCEDURE — 80053 COMPREHEN METABOLIC PANEL: CPT | Performed by: FAMILY MEDICINE

## 2021-09-20 ASSESSMENT — MIFFLIN-ST. JEOR: SCORE: 1783.29

## 2021-09-21 NOTE — RESULT ENCOUNTER NOTE
Antione,  It was nice to see you in the clinic yesterday.  Your test results are back and I would like to review those results.Your lipid profile looks significantly better than last year.  I am very happy with your lipids.  We like to see the total cholesterol below 200 and the LDL below 100 and both of those values are at goal.  Keep up with the good work with your dietary changes.    The metabolic panel showed that your electrolytes are normal including your calcium.  The BUN, creatinine and GFR look at your kidney function which is normal.  The blood glucose is a screening test for diabetes and 100 is normal.  The rest of the tests look at your liver function and the only value that is elevated is the total bilirubin at 2.7.  When that test is elevated and all of your other results for the liver are normal that usually is an indication of a benign condition called Gilbert's syndrome.  This is a condition you would have been born with and it causes no issues for your liver health.  People with this condition fairly consistently will have an elevated bilirubin level usually below 3.0 with other liver tests looking normal.    Continue with your exercise as your knee allows and if you have other questions or concerns do not be afraid to send a MyChart message to me.    We will plan to see you back in the clinic in 1 year.    Best regards,Dr. Feliciano

## 2021-09-21 NOTE — PROGRESS NOTES
ASSESSMENT/PLAN:       1. Elevated blood pressure reading without diagnosis of hypertension  Note that the patient's blood pressure is satisfactory today.  We will continue to follow    2. Screening for diabetes mellitus     - Comprehensive metabolic panel, fasting blood sugar is 100    3. Lipid screening     - Lipid panel reflex to direct LDL Fasting, 169/114/52/94  This is a nice improvement in his lipid profile compared to last time.  Pleased with these results    4. Need for shingles vaccine  We talked about the shingles vaccine and #1 I would make sure he checks with his insurance to see if it is covered by his health plan.  Secondly I would suggest that he wait about a year from when he had shingles to get the shingles vaccine.  Thus sometime in 2022 I would recommend the vaccine if it is covered.    5. Health care maintenance     - REVIEW OF HEALTH MAINTENANCE PROTOCOL ORDERS  We talked about the hepatitis C and HIV screening and at this point he does not feel compelled to go forward with those tests at this time.  He feels that he is at low risk and if he decides to do those tests he will wait till next year.    Total time same day as encounter 31 minutes which included review of outside records in regard to his right knee meniscus tear as well as medical records over the last 2 years in regard to his health care maintenance exams and labs.  Labs that were unique were ordered by myself, reviewed and managed.  No new medications  Follow-up 1 year preventive health care visit  Test results by [facility] Maintenance Due   Topic     Flu Vaccine (1)       Zander Feliciano MD      PROGRESS NOTE   9/21/2021    SUBJECTIVE:  [id number]  who presents for   Chief Complaint   Patient presents with     Establish Care     Formerly Dr. Alex. Pt Fasting.     The patient is seen today to establish care.  Previously cared for by Dr. Alex.  Patient would like to do any blood work that is needed today.    I do not see  that he has had HIV or hepatitis C screening in the past and the recommendation for that was discussed with the patient.    The patient has had shingles in February 2021 and wonders if he needs to have a shingles vaccine    The patient would like to wait until next month to get the influenza vaccine    Labs from January 2020 were reviewed    The patient has had at times an elevated blood pressure without a diagnosis of hypertension when seen in the clinic and he has recognized that even out of the office with blood pressure checks.    He had a torn meniscus right knee July 2021.  This was spontaneous and without known injury and he is recovering from his surgery.      Mode of exercises typically  is walking.        There is no problem list on file for this patient.      Current Outpatient Medications   Medication Sig Dispense Refill     aspirin 81 MG EC tablet [ASPIRIN 81 MG EC TABLET] Take 81 mg by mouth daily.       calcium carbonate-vitamin D2 500 mg(1,250mg) -200 unit tablet [CALCIUM CARBONATE-VITAMIN D2 500 MG(1,250MG) -200 UNIT TABLET] Take 1 tablet by mouth 2 (two) times a day.       fish oil-omega-3 fatty acids 300-1,000 mg capsule [FISH OIL-OMEGA-3 FATTY ACIDS 300-1,000 MG CAPSULE] Take 2 g by mouth daily.       multivitamin therapeutic (THERAGRAN) tablet [MULTIVITAMIN THERAPEUTIC (THERAGRAN) TABLET] Take 1 tablet by mouth daily.         History   Smoking Status     Never Smoker   Smokeless Tobacco     Never Used           OBJECTIVE:      Recent Results (from the past 48 hour(s))   Lipid panel reflex to direct LDL Fasting    Collection Time: 09/20/21  7:31 AM   Result Value Ref Range    Cholesterol 169 <=199 mg/dL    Triglycerides 114 <=149 mg/dL    Direct Measure HDL 52 >=40 mg/dL    LDL Cholesterol Calculated 94 <=129 mg/dL    Patient Fasting > 8hrs? Yes    Comprehensive metabolic panel    Collection Time: 09/20/21  7:31 AM   Result Value Ref Range    Sodium 140 136 - 145 mmol/L    Potassium 4.6 3.5 - 5.0  "mmol/L    Chloride 104 98 - 107 mmol/L    Carbon Dioxide (CO2) 27 22 - 31 mmol/L    Anion Gap 9 5 - 18 mmol/L    Urea Nitrogen 17 8 - 22 mg/dL    Creatinine 1.12 0.70 - 1.30 mg/dL    Calcium 9.9 8.5 - 10.5 mg/dL    Glucose 100 70 - 125 mg/dL    Alkaline Phosphatase 49 45 - 120 U/L    AST 21 0 - 40 U/L    ALT 28 0 - 45 U/L    Protein Total 7.3 6.0 - 8.0 g/dL    Albumin 4.2 3.5 - 5.0 g/dL    Bilirubin Total 2.7 (H) 0.0 - 1.0 mg/dL    GFR Estimate 72 >60 mL/min/1.73m2       Vitals:    09/20/21 0658   BP: 120/80   BP Location: Right arm   Patient Position: Sitting   Cuff Size: Adult Regular   Pulse: 80   SpO2: 96%   Weight: 93.1 kg (205 lb 3.2 oz)   Height: 1.82 m (5' 11.65\")     Wt Readings from Last 3 Encounters:   09/20/21 93.1 kg (205 lb 3.2 oz)   07/21/21 93.4 kg (206 lb)   02/22/21 93.9 kg (207 lb)           Physical Exam:  GENERAL APPEARANCE: 60-year-old male,who works for [employer] is in NAD, well hydrated, well nourished  SKIN:  Normal skin turgor, no lesions/rashes   HEENT: moist mucous membranes, no rhinorrhea  NECK: Normal without adenopathy or masses  CV: RRR, no M/G/R   LUNGS: CTAB  ABDOMEN: S&NT, no masses or enlarged organs   EXTREMITY: no edema and right knee has a brace because of a torn meniscus that was surgically treated July 29, 2021.  Patient experiencing expected rehabilitation and recovery.  NEURO: no focal findings    "

## 2021-09-30 PROBLEM — S83.241A TEAR OF MEDIAL MENISCUS OF RIGHT KNEE: Status: ACTIVE | Noted: 2021-09-30

## 2021-10-01 ENCOUNTER — IMMUNIZATION (OUTPATIENT)
Dept: FAMILY MEDICINE | Facility: CLINIC | Age: 61
End: 2021-10-01
Payer: COMMERCIAL

## 2021-10-01 PROCEDURE — 90471 IMMUNIZATION ADMIN: CPT

## 2021-10-01 PROCEDURE — 90682 RIV4 VACC RECOMBINANT DNA IM: CPT

## 2021-10-21 ENCOUNTER — TRANSFERRED RECORDS (OUTPATIENT)
Dept: HEALTH INFORMATION MANAGEMENT | Facility: CLINIC | Age: 61
End: 2021-10-21
Payer: COMMERCIAL

## 2021-11-16 ENCOUNTER — IMMUNIZATION (OUTPATIENT)
Dept: NURSING | Facility: CLINIC | Age: 61
End: 2021-11-16
Payer: COMMERCIAL

## 2021-11-16 PROCEDURE — 0004A PR COVID VAC PFIZER DIL RECON 30 MCG/0.3 ML IM: CPT

## 2021-11-16 PROCEDURE — 91300 PR COVID VAC PFIZER DIL RECON 30 MCG/0.3 ML IM: CPT

## 2022-06-09 ENCOUNTER — ALLIED HEALTH/NURSE VISIT (OUTPATIENT)
Dept: FAMILY MEDICINE | Facility: CLINIC | Age: 62
End: 2022-06-09
Payer: COMMERCIAL

## 2022-06-09 DIAGNOSIS — Z23 NEED FOR VACCINATION: Primary | ICD-10-CM

## 2022-06-09 PROCEDURE — 91305 COVID-19,PF,PFIZER (12+ YRS): CPT

## 2022-06-09 PROCEDURE — 99207 PR NO CHARGE NURSE ONLY: CPT

## 2022-06-09 PROCEDURE — 0054A COVID-19,PF,PFIZER (12+ YRS): CPT

## 2022-07-23 ENCOUNTER — HEALTH MAINTENANCE LETTER (OUTPATIENT)
Age: 62
End: 2022-07-23

## 2022-09-19 ASSESSMENT — ENCOUNTER SYMPTOMS
FREQUENCY: 0
NAUSEA: 0
CHILLS: 0
NERVOUS/ANXIOUS: 0
HEARTBURN: 0
COUGH: 0
MYALGIAS: 0
JOINT SWELLING: 1
SHORTNESS OF BREATH: 0
DIZZINESS: 0
HEADACHES: 0
DIARRHEA: 0
ARTHRALGIAS: 1
HEMATOCHEZIA: 0
PALPITATIONS: 0
HEMATURIA: 0
PARESTHESIAS: 0
WEAKNESS: 0
EYE PAIN: 0
ABDOMINAL PAIN: 0
DYSURIA: 0
FEVER: 0
SORE THROAT: 0
CONSTIPATION: 0

## 2022-09-22 ENCOUNTER — OFFICE VISIT (OUTPATIENT)
Dept: FAMILY MEDICINE | Facility: CLINIC | Age: 62
End: 2022-09-22
Payer: COMMERCIAL

## 2022-09-22 VITALS
WEIGHT: 199 LBS | DIASTOLIC BLOOD PRESSURE: 72 MMHG | HEART RATE: 87 BPM | BODY MASS INDEX: 26.95 KG/M2 | SYSTOLIC BLOOD PRESSURE: 120 MMHG | HEIGHT: 72 IN | OXYGEN SATURATION: 96 %

## 2022-09-22 DIAGNOSIS — Z12.5 SCREENING FOR PROSTATE CANCER: ICD-10-CM

## 2022-09-22 DIAGNOSIS — Z00.00 ANNUAL PHYSICAL EXAM: Primary | ICD-10-CM

## 2022-09-22 DIAGNOSIS — Z12.11 SCREEN FOR COLON CANCER: ICD-10-CM

## 2022-09-22 DIAGNOSIS — R17 ELEVATED BILIRUBIN: ICD-10-CM

## 2022-09-22 DIAGNOSIS — Z13.220 LIPID SCREENING: ICD-10-CM

## 2022-09-22 PROBLEM — S83.241A TEAR OF MEDIAL MENISCUS OF RIGHT KNEE: Status: RESOLVED | Noted: 2021-09-30 | Resolved: 2022-09-22

## 2022-09-22 LAB
ALBUMIN SERPL BCG-MCNC: 4.5 G/DL (ref 3.5–5.2)
ALP SERPL-CCNC: 46 U/L (ref 40–129)
ALT SERPL W P-5'-P-CCNC: 29 U/L (ref 10–50)
ANION GAP SERPL CALCULATED.3IONS-SCNC: 9 MMOL/L (ref 7–15)
AST SERPL W P-5'-P-CCNC: 24 U/L (ref 10–50)
BILIRUB SERPL-MCNC: 1.5 MG/DL
BUN SERPL-MCNC: 19.6 MG/DL (ref 8–23)
CALCIUM SERPL-MCNC: 9.8 MG/DL (ref 8.6–10)
CHLORIDE SERPL-SCNC: 102 MMOL/L (ref 98–107)
CHOLEST SERPL-MCNC: 151 MG/DL
CREAT SERPL-MCNC: 1.17 MG/DL (ref 0.67–1.17)
DEPRECATED HCO3 PLAS-SCNC: 26 MMOL/L (ref 22–29)
GFR SERPL CREATININE-BSD FRML MDRD: 72 ML/MIN/1.73M2
GLUCOSE SERPL-MCNC: 106 MG/DL (ref 70–99)
HDLC SERPL-MCNC: 56 MG/DL
LDLC SERPL CALC-MCNC: 80 MG/DL
NONHDLC SERPL-MCNC: 95 MG/DL
POTASSIUM SERPL-SCNC: 4.2 MMOL/L (ref 3.4–5.3)
PROT SERPL-MCNC: 7.1 G/DL (ref 6.4–8.3)
PSA SERPL-MCNC: 1.82 NG/ML (ref 0–3.5)
SODIUM SERPL-SCNC: 137 MMOL/L (ref 136–145)
TRIGL SERPL-MCNC: 77 MG/DL

## 2022-09-22 PROCEDURE — G0103 PSA SCREENING: HCPCS | Performed by: NURSE PRACTITIONER

## 2022-09-22 PROCEDURE — 80053 COMPREHEN METABOLIC PANEL: CPT | Performed by: NURSE PRACTITIONER

## 2022-09-22 PROCEDURE — 99396 PREV VISIT EST AGE 40-64: CPT | Performed by: NURSE PRACTITIONER

## 2022-09-22 PROCEDURE — 80061 LIPID PANEL: CPT | Performed by: NURSE PRACTITIONER

## 2022-09-22 PROCEDURE — 36415 COLL VENOUS BLD VENIPUNCTURE: CPT | Performed by: NURSE PRACTITIONER

## 2022-09-22 ASSESSMENT — ENCOUNTER SYMPTOMS
PARESTHESIAS: 0
FEVER: 0
CONSTIPATION: 0
WEAKNESS: 0
DIARRHEA: 0
NERVOUS/ANXIOUS: 0
ARTHRALGIAS: 1
MYALGIAS: 0
PALPITATIONS: 0
NAUSEA: 0
ABDOMINAL PAIN: 0
EYE PAIN: 0
JOINT SWELLING: 1
COUGH: 0
SHORTNESS OF BREATH: 0
HEARTBURN: 0
SORE THROAT: 0
FREQUENCY: 0
HEMATURIA: 0
DYSURIA: 0
HEMATOCHEZIA: 0
HEADACHES: 0
DIZZINESS: 0
CHILLS: 0

## 2022-09-22 ASSESSMENT — PAIN SCALES - GENERAL: PAINLEVEL: NO PAIN (0)

## 2022-09-22 NOTE — PROGRESS NOTES
SUBJECTIVE:   CC: Antione is an 61 year old who presents for preventative health visit.     Moves with walking snowshoeing and biking.  Recently got a new puppy still staying more active with that.  Sister recently had an A. fib episode which has been managed both medication.    Patient has been advised of split billing requirements and indicates understanding: Yes  Healthy Habits:     Getting at least 3 servings of Calcium per day:  Yes    Bi-annual eye exam:  Yes    Dental care twice a year:  Yes    Sleep apnea or symptoms of sleep apnea:  None    Diet:  Regular (no restrictions)    Frequency of exercise:  2-3 days/week    Duration of exercise:  30-45 minutes    Taking medications regularly:  Yes    Medication side effects:  Not applicable    PHQ-2 Total Score: 0    Additional concerns today:  No      Today's PHQ-2 Score:   PHQ-2 ( 1999 [employer]) 9/19/2022   Q1: Little interest or pleasure in doing things 0   Q2: Feeling down, depressed or hopeless 0   PHQ-2 Score 0   PHQ-2 Total Score (12-17 Years)- Positive if 3 or more points; Administer PHQ-A if positive -   Q1: Little interest or pleasure in doing things Not at all   Q2: Feeling down, depressed or hopeless Not at all   PHQ-2 Score 0       Abuse: Current or Past(Physical, Sexual or Emotional)- No  Do you feel safe in your environment? Yes    Have you ever done Advance Care Planning? (For example, a Health Directive, POLST, or a discussion with a medical provider or your loved ones about your wishes): Yes, patient states has an Advance Care Planning document and will bring a copy to the clinic.    Social History     Tobacco Use     Smoking status: Never Smoker     Smokeless tobacco: Never Used   Substance Use Topics     Alcohol use: Yes     Alcohol/week: 7.0 standard drinks     If you drink alcohol do you typically have >3 drinks per day or >7 drinks per week? No    Alcohol Use 9/19/2022   Prescreen: >3 drinks/day or >7 drinks/week? No     Last PSA:   Prostate  Specific Antigen Screen   Date Value Ref Range Status   01/06/2020 2.8 0.0 - 3.5 ng/mL Final       Reviewed orders with patient. Reviewed health maintenance and updated orders accordingly - Yes  Lab work is in process    Reviewed and updated as needed this visit by clinical staff   Tobacco  Allergies  Meds                Reviewed and updated as needed this visit by Provider                   No past medical history on file.   Past Surgical History:   Procedure Laterality Date     APPENDECTOMY       AS KNEE SCOPE,MED/LAT MENISCUS REPAIR Right      VASECTOMY         Review of Systems   Constitutional: Negative for chills and fever.   HENT: Negative for congestion, ear pain, hearing loss and sore throat.    Eyes: Negative for pain and visual disturbance.   Respiratory: Negative for cough and shortness of breath.    Cardiovascular: Negative for chest pain, palpitations and peripheral edema.   Gastrointestinal: Negative for abdominal pain, constipation, diarrhea, heartburn, hematochezia and nausea.   Genitourinary: Negative for dysuria, frequency, genital sores, hematuria, impotence, penile discharge and urgency.   Musculoskeletal: Positive for arthralgias and joint swelling. Negative for myalgias.   Skin: Negative for rash.   Neurological: Negative for dizziness, weakness, headaches and paresthesias.   Psychiatric/Behavioral: Negative for mood changes. The patient is not nervous/anxious.        OBJECTIVE:   /72   Pulse 87   Ht 1.829 m (6')   Wt 90.3 kg (199 lb)   SpO2 96%   BMI 26.99 kg/m      Physical Exam  GENERAL: healthy, alert and no distress  EYES: Eyes grossly normal to inspection, PERRL and conjunctivae and sclerae normal  HENT: ear canals and TM's normal, nose and mouth without ulcers or lesions  NECK: no adenopathy, no asymmetry, masses, or scars and thyroid normal to palpation  RESP: lungs clear to auscultation - no rales, rhonchi or wheezes  CV: regular rate and rhythm, normal S1 S2, no S3 or  S4, no murmur, click or rub, no peripheral edema and peripheral pulses strong  ABDOMEN: soft, nontender, no hepatosplenomegaly, no masses and bowel sounds normal  MS: no gross musculoskeletal defects noted, no edema  SKIN: no suspicious lesions or rashes  NEURO: Normal strength and tone, mentation intact and speech normal  PSYCH: mentation appears normal, affect normal/bright    Diagnostic Test Results:  Labs reviewed in Epic    ASSESSMENT/PLAN:       ICD-10-CM    1. Annual physical exam  Z00.00    2. Screen for colon cancer  Z12.11 Colonoscopy Screening  Referral   3. Screening for HIV (human immunodeficiency virus)  Z11.4    4. Need for hepatitis C screening test  Z11.59    5. Routine general medical examination at a health care facility  Z00.00    6. Lipid screening  Z13.220 Lipid panel   7. Elevated bilirubin  R17 Comprehensive metabolic panel (BMP + Alb, Alk Phos, ALT, AST, Total. Bili, TP)   8. Screening for prostate cancer  Z12.5 PSA, screen     Update screening colonoscopy at the age of 62.  Vaccines declined today.  Continue same exercise regimen.        COUNSELING:   Reviewed preventive health counseling, as reflected in patient instructions    Estimated body mass index is 26.99 kg/m  as calculated from the following:    Height as of this encounter: 1.829 m (6').    Weight as of this encounter: 90.3 kg (199 lb).         He reports that he has never smoked. He has never used smokeless tobacco.      Counseling Resources:  ATP IV Guidelines  Pooled Cohorts Equation Calculator  FRAX Risk Assessment  ICSI Preventive Guidelines  Dietary Guidelines for Americans, 2010  USDA's MyPlate  ASA Prophylaxis  Lung CA Screening    Rakesh Rice NP  [facility]

## 2022-09-22 NOTE — PATIENT INSTRUCTIONS
"That shingles vaccine we talked about is called \"Shingrix\". Check with your insurance to see if they cover it. If they do and you're interested in getting it, let me know and we can have you come in for just the shot without having to see me.    Colonoscopy almost due.     Keep up the good work!    Preventive Health Recommendations  Male Ages 50 - 64    Yearly exam:             See your health care provider every year in order to  o   Review health changes.   o   Discuss preventive care.    o   Review your medicines if your doctor has prescribed any.   Have a cholesterol test every 5 years, or more frequently if you are at risk for high cholesterol/heart disease.   Have a diabetes test (fasting glucose) every three years. If you are at risk for diabetes, you should have this test more often.   Have a colonoscopy at age 50, or have a yearly FIT test (stool test). These exams will check for colon cancer.    Talk with your health care provider about whether or not a prostate cancer screening test (PSA) is right for you.  You should be tested each year for STDs (sexually transmitted diseases), if you re at risk.     Shots: Get a flu shot each year. Get a tetanus shot every 10 years.     Nutrition:  Eat at least 5 servings of fruits and vegetables daily.   Eat whole-grain bread, whole-wheat pasta and brown rice instead of white grains and rice.   Get adequate Calcium and Vitamin D.     Lifestyle  Exercise for at least 150 minutes a week (30 minutes a day, 5 days a week). This will help you control your weight and prevent disease.   Limit alcohol to one drink per day.   No smoking.   Wear sunscreen to prevent skin cancer.   See your dentist every six months for an exam and cleaning.   See your eye doctor every 1 to 2 years.    "

## 2022-10-01 ENCOUNTER — HEALTH MAINTENANCE LETTER (OUTPATIENT)
Age: 62
End: 2022-10-01

## 2022-10-10 ENCOUNTER — NURSE TRIAGE (OUTPATIENT)
Dept: NURSING | Facility: CLINIC | Age: 62
End: 2022-10-10

## 2022-10-10 ENCOUNTER — APPOINTMENT (OUTPATIENT)
Dept: ULTRASOUND IMAGING | Facility: CLINIC | Age: 62
End: 2022-10-10
Attending: EMERGENCY MEDICINE
Payer: COMMERCIAL

## 2022-10-10 ENCOUNTER — HOSPITAL ENCOUNTER (EMERGENCY)
Facility: CLINIC | Age: 62
Discharge: HOME | End: 2022-10-10
Attending: STUDENT IN AN ORGANIZED HEALTH CARE EDUCATION/TRAINING PROGRAM | Admitting: STUDENT IN AN ORGANIZED HEALTH CARE EDUCATION/TRAINING PROGRAM
Payer: COMMERCIAL

## 2022-10-10 VITALS
HEIGHT: 72 IN | SYSTOLIC BLOOD PRESSURE: 155 MMHG | BODY MASS INDEX: 26.95 KG/M2 | HEART RATE: 70 BPM | OXYGEN SATURATION: 97 % | WEIGHT: 199 LBS | RESPIRATION RATE: 16 BRPM | DIASTOLIC BLOOD PRESSURE: 93 MMHG | TEMPERATURE: 97.1 F

## 2022-10-10 DIAGNOSIS — R20.2 PARESTHESIA: ICD-10-CM

## 2022-10-10 LAB
ANION GAP SERPL CALCULATED.3IONS-SCNC: 8 MMOL/L (ref 5–18)
BUN SERPL-MCNC: 17 MG/DL (ref 8–22)
CALCIUM SERPL-MCNC: 9.2 MG/DL (ref 8.5–10.5)
CHLORIDE BLD-SCNC: 105 MMOL/L (ref 98–107)
CO2 SERPL-SCNC: 24 MMOL/L (ref 22–31)
CREAT SERPL-MCNC: 1.06 MG/DL (ref 0.7–1.3)
ERYTHROCYTE [DISTWIDTH] IN BLOOD BY AUTOMATED COUNT: 13 % (ref 10–15)
GFR SERPL CREATININE-BSD FRML MDRD: 81 ML/MIN/1.73M2
GLUCOSE BLD-MCNC: 101 MG/DL (ref 70–125)
HCT VFR BLD AUTO: 43.7 % (ref 40–53)
HGB BLD-MCNC: 14.3 G/DL (ref 13.3–17.7)
MAGNESIUM SERPL-MCNC: 2.1 MG/DL (ref 1.8–2.6)
MCH RBC QN AUTO: 30.8 PG (ref 26.5–33)
MCHC RBC AUTO-ENTMCNC: 32.7 G/DL (ref 31.5–36.5)
MCV RBC AUTO: 94 FL (ref 78–100)
PLATELET # BLD AUTO: 212 10E3/UL (ref 150–450)
POTASSIUM BLD-SCNC: 4.3 MMOL/L (ref 3.5–5)
RBC # BLD AUTO: 4.65 10E6/UL (ref 4.4–5.9)
SODIUM SERPL-SCNC: 137 MMOL/L (ref 136–145)
WBC # BLD AUTO: 7.1 10E3/UL (ref 4–11)

## 2022-10-10 PROCEDURE — 36415 COLL VENOUS BLD VENIPUNCTURE: CPT | Performed by: EMERGENCY MEDICINE

## 2022-10-10 PROCEDURE — 83735 ASSAY OF MAGNESIUM: CPT | Performed by: STUDENT IN AN ORGANIZED HEALTH CARE EDUCATION/TRAINING PROGRAM

## 2022-10-10 PROCEDURE — 93971 EXTREMITY STUDY: CPT | Mod: RT

## 2022-10-10 PROCEDURE — 80048 BASIC METABOLIC PNL TOTAL CA: CPT | Performed by: EMERGENCY MEDICINE

## 2022-10-10 PROCEDURE — 85027 COMPLETE CBC AUTOMATED: CPT | Performed by: EMERGENCY MEDICINE

## 2022-10-10 PROCEDURE — 99284 EMERGENCY DEPT VISIT MOD MDM: CPT | Mod: 25

## 2022-10-10 NOTE — TELEPHONE ENCOUNTER
"Nurse Triage SBAR    Is this a 2nd Level Triage? YES, LICENSED PRACTITIONER REVIEW IS REQUIRED  Please call patient at [phone] (home) advise if able to work into clinic or where you prefer he is seen.    Situation:     Patient reporting right calf pain    Background:     Denies previous history/reporting sibling recently diagnosed with a blood clot    Assessment:     Patient calling reporting waking with right lower leg/calf pain \"cramping\" at 645 a.m. this morning.  Stating the pain went away and returned again in past 45 min-1 hour.   Denies any visible change to calf or foot including redness or warmth to the area.   Reporting some tingling in right foot.   Patient is able to bear weight, stating he went to work.   Pain rating \"3-4\" on 1-10 pain scale. Patient has not taken anything for pain.    Protocol Recommended Disposition:   Go To ED/UCC Now (Or To Office With PCP Approval)    Recommendation:     Please advise where you prefer patient to be seen.     Routed to provider    Does the patient meet one of the following criteria for ADS visit consideration? 16+ years old, with an [facility] PCP     TIP  Providers, please consider if this condition is appropriate for management at one of our Acute and Diagnostic Services sites.     If patient is a good candidate, please use dotphrase <dot>triageresponse and select Refer to ADS to document.      Reason for Disposition    Thigh or calf pain in only one leg and present > 1 hour    Additional Information    Negative: Looks like a broken bone or dislocated joint (e.g., crooked or deformed)    Negative: Sounds like a life-threatening emergency to the triager    Negative: Followed a hip injury    Negative: Followed a knee injury    Negative: Followed an ankle or foot injury    Negative: Back pain radiating (shooting) into leg(s)    Negative: Foot pain is main symptom    Negative: Ankle pain is main symptom    Negative: Knee pain is main symptom    Negative: Leg swelling " is main symptom    Negative: Chest pain    Negative: Difficulty breathing    Negative: Entire foot is cool or blue in comparison to other side    Negative: Unable to walk    Negative: Fever and red area (or area very tender to touch)    Negative: Swollen joint and fever    Protocols used: LEG PAIN-A-OH

## 2022-10-10 NOTE — ED PROVIDER NOTES
Emergency Department Encounter         FINAL IMPRESSION:  Paresthesias        ED COURSE AND MEDICAL DECISION MAKING       ED Course as of 10/10/22 1456   Mon Oct 10, 2022   1427 Patient is a healthy 61-year-old male here with bilateral feet paresthesias began this morning around 7 AM.  Patient states he otherwise feels well.  No new trauma or exposures.  States he started have some right calf pain as well today which initiated a visit for clot rule out.  In triage patient's labs are normal and ultrasound normal.  Patient denies any other symptoms including headaches, vision issues, dysarthria, neck pain, back pain, chest pain, trouble breathing, weakness in upper extremities or lower extremities, bowel or bladder incontinence, saddle anesthesia, abdominal pain, dysuria, fevers or weight loss.  On arrival patient looks well.  Vitals are stable.  NIH of 1 as patient has bilateral feet paresthesias.  Otherwise normal strength bilaterally.  No dysmetria.  Cranial nerves intact.   Patient denies any ascending symptoms.  His right calf feels mildly sore at this time with no left calf symptoms.  No chest pain trouble breathing pleuritic pain or hemoptysis.  Unsure was causing his symptoms.  GBS is in the differential however no recent immunizations or diarrheal/viral illness.  Unlikely MS or another central process.  Epidural abscess unlikely with no fever, or any other central cord process.  Patient seems reliable along with his wife.  We gave return precautions.  Recommending pushing oral hydration, continued exercise and healthy diet.    2:11 PM I met with the patient for the initial interview and physical examination. Discussed plan for treatment and workup in the ED. PPE: Provider wore gloves, and paper mask.       Medical Decision Making    Supplemental history from: Family Member    External Record(s) Reviewed: N/A    Differential Diagnosis: See MDM charting for differential considered.     I performed an  independent interpretation of the: N/A    Discussed with radiology regarding test interpretation: N/A    Discussion of management with another provider: N/A    The following testing was considered but ultimately not selected: MRI Imaging: Not clinically indicated at this time however may need as an outpatient.    I considered prescription management with: N/A    The patient's care impacted: None    Consideration of Admission/Observation: N/A    Care significantly affected by Social Determinants of Health including: N/A       At the conclusion of the encounter I discussed the results of all the tests and the disposition. The questions were answered. The patient or family acknowledged understanding and was agreeable with the care plan.    MEDICATIONS GIVEN IN THE EMERGENCY DEPARTMENT:  Medications - No data to display    NEW PRESCRIPTIONS STARTED AT TODAY'S ED VISIT:  Discharge Medication List as of 10/10/2022  2:28 PM          HPI     Patient information obtained from: the patient     Use of Interpretor: N/A     Dashawn Yeung is a 61 year old male with no recorded pertinent history who presents to this ED via walk in for evaluation of leg pain and foot numbness.    The patient reports the onset of bilateral foot numbness and right calf pain this morning at 0700 while he was unloading the . He also endorses bilateral foot tingling. He took two tylenol this morning which provided some relief for his calf pain. He denies any recent injuries or strenuous activity. He states that he is a big walker, and did have his grandchildren over this weekend, but does not remember twisting anything or pulling any muscle. He denies any recent sickness, surgeries, or vaccines. The patient denies difficulty walking, headache, neck pain, left calf pain, cough, congestion, fever, dysuria, chest pain, shortness of breath, back pain, abdominal pain, incontinence, speech change,  issues, vomiting, diaphoresis, weight  change, ankle pain, black or bloody stool, vision changes, difficulty swallowing, and any other symptoms or complaints at this time.     REVIEW OF SYSTEMS:  Review of Systems   Constitutional: Negative for fever, malaise, diaphoresis, weight change  HEENT: Negative runny nose, sore throat, ear pain, neck pain  Respiratory: Negative for shortness of breath, cough, congestion  Cardiovascular: Negative for chest pain, leg edema  Gastrointestinal: Negative for abdominal distention, abdominal pain, constipation, vomiting, nausea, diarrhea, black or bloody stool  Genitourinary: Negative for incontinence, dysuria and hematuria.   Integument: Negative for rash, skin breakdown  Neurological: Negative for weakness, headache, speech change. Endorses bilateral foot numbness and tingling  Musculoskeletal: Negative for joint pain, joint swelling, left calf pain, difficulty walking. Endorses right calf pain      All other systems reviewed and are negative.          MEDICAL HISTORY     History reviewed. No pertinent past medical history.    Past Surgical History:   Procedure Laterality Date     APPENDECTOMY       AS KNEE SCOPE,MED/LAT MENISCUS REPAIR Right      VASECTOMY         Social History     Tobacco Use     Smoking status: Never     Smokeless tobacco: Never   Vaping Use     Vaping Use: Never used   Substance Use Topics     Alcohol use: Yes     Alcohol/week: 7.0 standard drinks     Drug use: No       aspirin 81 MG EC tablet  calcium carbonate-vitamin D2 500 mg(1,250mg) -200 unit tablet  fish oil-omega-3 fatty acids 300-1,000 mg capsule  multivitamin therapeutic (THERAGRAN) tablet            PHYSICAL EXAM     BP (!) 155/93   Pulse 70   Temp 97.1  F (36.2  C)   Resp 16   Ht 1.829 m (6')   Wt 90.3 kg (199 lb)   SpO2 97%   BMI 26.99 kg/m        PHYSICAL EXAM:     General: Patient appears well, nontoxic, comfortable  HEENT: Moist mucous membranes,  No head trauma.  No midline neck pain.  Cardiovascular: Normal rate, normal  rhythm, no extremity edema.  No appreciable murmur.  Respiratory: No signs of respiratory distress, lungs are clear to auscultation bilaterally with no wheezes rhonchi or rales.  Abdominal: Soft, nontender, nondistended, no palpable masses, no guarding, no rebound  Musculoskeletal: Full range of motion of joints, no deformities appreciated.  Neurological: Alert and oriented, grossly neurologically intact. NIH of 1. Bilateral feet paresthesias, but normal strength bilaterally. No dysmetria. Cranial nerves intact.   Psychological: Normal affect and mood.  Integument: No rashes appreciated        RESULTS       Labs Ordered and Resulted from Time of ED Arrival to Time of ED Departure   BASIC METABOLIC PANEL - Normal       Result Value    Sodium 137      Potassium 4.3      Chloride 105      Carbon Dioxide (CO2) 24      Anion Gap 8      Urea Nitrogen 17      Creatinine 1.06      Calcium 9.2      Glucose 101      GFR Estimate 81     CBC WITH PLATELETS - Normal    WBC Count 7.1      RBC Count 4.65      Hemoglobin 14.3      Hematocrit 43.7      MCV 94      MCH 30.8      MCHC 32.7      RDW 13.0      Platelet Count 212         US Lower Extremity Venous Duplex Right   Final Result   IMPRESSION:   1.  No deep venous thrombosis in the right lower extremity.                        PROCEDURES:  Procedures:  Procedures       I, Christine Stewart am serving as a scribe to document services personally performed by Anson Whitehead DO, based on my observations and the provider's statements to me.  I, Anson Whitehead DO, attest that Christine Terry is acting in a scribe capacity, has observed my performance of the services and has documented them in accordance with my direction.    Anson Whitehead DO  Emergency Medicine  [facility] EMERGENCY ROOM      Anson Whitehead DO  10/10/22 [id number]

## 2022-10-27 ENCOUNTER — ALLIED HEALTH/NURSE VISIT (OUTPATIENT)
Dept: FAMILY MEDICINE | Facility: CLINIC | Age: 62
End: 2022-10-27
Payer: COMMERCIAL

## 2022-10-27 DIAGNOSIS — Z23 IMMUNIZATION DUE: Primary | ICD-10-CM

## 2022-10-27 PROCEDURE — 99207 PR NO CHARGE NURSE ONLY: CPT

## 2022-10-27 PROCEDURE — 90471 IMMUNIZATION ADMIN: CPT

## 2022-10-27 PROCEDURE — 90682 RIV4 VACC RECOMBINANT DNA IM: CPT

## 2022-11-03 ENCOUNTER — ALLIED HEALTH/NURSE VISIT (OUTPATIENT)
Dept: FAMILY MEDICINE | Facility: CLINIC | Age: 62
End: 2022-11-03
Payer: COMMERCIAL

## 2022-11-03 DIAGNOSIS — Z23 IMMUNIZATION DUE: Primary | ICD-10-CM

## 2022-11-03 PROCEDURE — 0124A COVID-19,PF,PFIZER BOOSTER BIVALENT: CPT

## 2022-11-03 PROCEDURE — 99207 PR NO CHARGE NURSE ONLY: CPT

## 2022-11-03 PROCEDURE — 91312 COVID-19,PF,PFIZER BOOSTER BIVALENT: CPT

## 2022-11-22 ENCOUNTER — HOSPITAL ENCOUNTER (OUTPATIENT)
Facility: CLINIC | Age: 62
Setting detail: OBSERVATION
Discharge: HOME | End: 2022-11-23
Attending: EMERGENCY MEDICINE | Admitting: HOSPITALIST
Payer: COMMERCIAL

## 2022-11-22 ENCOUNTER — TRANSFERRED RECORDS (OUTPATIENT)
Dept: HEALTH INFORMATION MANAGEMENT | Facility: CLINIC | Age: 62
End: 2022-11-22
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
## 2022-11-22 DIAGNOSIS — S82.892A ANKLE FRACTURE, LEFT, CLOSED, INITIAL ENCOUNTER: ICD-10-CM

## 2022-11-22 DIAGNOSIS — R55 SYNCOPE, UNSPECIFIED SYNCOPE TYPE: ICD-10-CM

## 2022-11-22 DIAGNOSIS — S62.102A WRIST FRACTURE, LEFT, CLOSED, INITIAL ENCOUNTER: ICD-10-CM

## 2022-11-22 DIAGNOSIS — S30.0XXA CONTUSION, BUTTOCK, INITIAL ENCOUNTER: ICD-10-CM

## 2022-11-22 LAB — LACTATE SERPL-SCNC: 1.3 MMOL/L (ref 0.7–2)

## 2022-11-22 PROCEDURE — 96374 THER/PROPH/DIAG INJ IV PUSH: CPT

## 2022-11-22 PROCEDURE — C9803 HOPD COVID-19 SPEC COLLECT: HCPCS

## 2022-11-22 PROCEDURE — 83605 ASSAY OF LACTIC ACID: CPT | Performed by: EMERGENCY MEDICINE

## 2022-11-22 PROCEDURE — 80053 COMPREHEN METABOLIC PANEL: CPT | Performed by: EMERGENCY MEDICINE

## 2022-11-22 PROCEDURE — 84484 ASSAY OF TROPONIN QUANT: CPT | Performed by: EMERGENCY MEDICINE

## 2022-11-22 PROCEDURE — 85025 COMPLETE CBC W/AUTO DIFF WBC: CPT | Performed by: EMERGENCY MEDICINE

## 2022-11-22 PROCEDURE — 93005 ELECTROCARDIOGRAM TRACING: CPT | Performed by: EMERGENCY MEDICINE

## 2022-11-22 PROCEDURE — 99285 EMERGENCY DEPT VISIT HI MDM: CPT | Mod: 25

## 2022-11-22 PROCEDURE — 85730 THROMBOPLASTIN TIME PARTIAL: CPT | Performed by: EMERGENCY MEDICINE

## 2022-11-22 PROCEDURE — 36415 COLL VENOUS BLD VENIPUNCTURE: CPT | Performed by: EMERGENCY MEDICINE

## 2022-11-22 PROCEDURE — 85610 PROTHROMBIN TIME: CPT | Performed by: EMERGENCY MEDICINE

## 2022-11-23 ENCOUNTER — APPOINTMENT (OUTPATIENT)
Dept: CT IMAGING | Facility: CLINIC | Age: 62
End: 2022-11-23
Attending: EMERGENCY MEDICINE
Payer: COMMERCIAL

## 2022-11-23 ENCOUNTER — APPOINTMENT (OUTPATIENT)
Dept: PHYSICAL THERAPY | Facility: CLINIC | Age: 62
End: 2022-11-23
Attending: HOSPITALIST
Payer: COMMERCIAL

## 2022-11-23 ENCOUNTER — APPOINTMENT (OUTPATIENT)
Dept: OCCUPATIONAL THERAPY | Facility: CLINIC | Age: 62
End: 2022-11-23
Attending: HOSPITALIST
Payer: COMMERCIAL

## 2022-11-23 ENCOUNTER — APPOINTMENT (OUTPATIENT)
Dept: CARDIOLOGY | Facility: CLINIC | Age: 62
End: 2022-11-23
Attending: HOSPITALIST
Payer: COMMERCIAL

## 2022-11-23 VITALS
TEMPERATURE: 98.1 F | HEIGHT: 72 IN | HEART RATE: 77 BPM | SYSTOLIC BLOOD PRESSURE: 123 MMHG | OXYGEN SATURATION: 95 % | BODY MASS INDEX: 26.99 KG/M2 | RESPIRATION RATE: 20 BRPM | DIASTOLIC BLOOD PRESSURE: 81 MMHG

## 2022-11-23 LAB
ALBUMIN SERPL-MCNC: 3.9 G/DL (ref 3.5–5)
ALBUMIN UR-MCNC: 10 MG/DL
ALP SERPL-CCNC: 44 U/L (ref 45–120)
ALT SERPL W P-5'-P-CCNC: 23 U/L (ref 0–45)
ANION GAP SERPL CALCULATED.3IONS-SCNC: 9 MMOL/L (ref 5–18)
APPEARANCE UR: CLEAR
APTT PPP: 27 SECONDS (ref 22–38)
AST SERPL W P-5'-P-CCNC: 21 U/L (ref 0–40)
ATRIAL RATE - MUSE: 55 BPM
BASOPHILS # BLD AUTO: 0 10E3/UL (ref 0–0.2)
BASOPHILS NFR BLD AUTO: 0 %
BILIRUB SERPL-MCNC: 2.9 MG/DL (ref 0–1)
BILIRUB UR QL STRIP: NEGATIVE
BUN SERPL-MCNC: 18 MG/DL (ref 8–22)
CALCIUM SERPL-MCNC: 8.8 MG/DL (ref 8.5–10.5)
CHLORIDE BLD-SCNC: 102 MMOL/L (ref 98–107)
CO2 SERPL-SCNC: 26 MMOL/L (ref 22–31)
COLOR UR AUTO: ABNORMAL
CREAT SERPL-MCNC: 1.13 MG/DL (ref 0.7–1.3)
DIASTOLIC BLOOD PRESSURE - MUSE: 69 MMHG
EOSINOPHIL # BLD AUTO: 0.1 10E3/UL (ref 0–0.7)
EOSINOPHIL NFR BLD AUTO: 1 %
ERYTHROCYTE [DISTWIDTH] IN BLOOD BY AUTOMATED COUNT: 12.5 % (ref 10–15)
GFR SERPL CREATININE-BSD FRML MDRD: 75 ML/MIN/1.73M2
GLUCOSE BLD-MCNC: 141 MG/DL (ref 70–125)
GLUCOSE UR STRIP-MCNC: NEGATIVE MG/DL
HCT VFR BLD AUTO: 37.5 % (ref 40–53)
HGB BLD-MCNC: 12.5 G/DL (ref 13.3–17.7)
HGB UR QL STRIP: NEGATIVE
IMM GRANULOCYTES # BLD: 0.1 10E3/UL
IMM GRANULOCYTES NFR BLD: 1 %
INR PPP: 1.07 (ref 0.85–1.15)
INTERPRETATION ECG - MUSE: NORMAL
KETONES UR STRIP-MCNC: NEGATIVE MG/DL
LEUKOCYTE ESTERASE UR QL STRIP: NEGATIVE
LVEF ECHO: NORMAL
LYMPHOCYTES # BLD AUTO: 1.4 10E3/UL (ref 0.8–5.3)
LYMPHOCYTES NFR BLD AUTO: 15 %
MCH RBC QN AUTO: 30.4 PG (ref 26.5–33)
MCHC RBC AUTO-ENTMCNC: 33.3 G/DL (ref 31.5–36.5)
MCV RBC AUTO: 91 FL (ref 78–100)
MONOCYTES # BLD AUTO: 0.6 10E3/UL (ref 0–1.3)
MONOCYTES NFR BLD AUTO: 6 %
MUCOUS THREADS #/AREA URNS LPF: PRESENT /LPF
NEUTROPHILS # BLD AUTO: 7.2 10E3/UL (ref 1.6–8.3)
NEUTROPHILS NFR BLD AUTO: 77 %
NITRATE UR QL: NEGATIVE
NRBC # BLD AUTO: 0 10E3/UL
NRBC BLD AUTO-RTO: 0 /100
P AXIS - MUSE: 33 DEGREES
PH UR STRIP: 7 [PH] (ref 5–7)
PLATELET # BLD AUTO: 199 10E3/UL (ref 150–450)
POTASSIUM BLD-SCNC: 3.7 MMOL/L (ref 3.5–5)
PR INTERVAL - MUSE: 174 MS
PROT SERPL-MCNC: 6.7 G/DL (ref 6–8)
QRS DURATION - MUSE: 84 MS
QT - MUSE: 416 MS
QTC - MUSE: 397 MS
R AXIS - MUSE: 10 DEGREES
RBC # BLD AUTO: 4.11 10E6/UL (ref 4.4–5.9)
RBC URINE: 0 /HPF
SARS-COV-2 RNA RESP QL NAA+PROBE: NEGATIVE
SODIUM SERPL-SCNC: 137 MMOL/L (ref 136–145)
SP GR UR STRIP: >1.05 (ref 1–1.03)
SYSTOLIC BLOOD PRESSURE - MUSE: 116 MMHG
T AXIS - MUSE: -6 DEGREES
TROPONIN I SERPL-MCNC: 0.01 NG/ML (ref 0–0.29)
UROBILINOGEN UR STRIP-MCNC: <2 MG/DL
VENTRICULAR RATE- MUSE: 55 BPM
WBC # BLD AUTO: 9.4 10E3/UL (ref 4–11)
WBC URINE: 0 /HPF

## 2022-11-23 PROCEDURE — 97161 PT EVAL LOW COMPLEX 20 MIN: CPT | Mod: GP

## 2022-11-23 PROCEDURE — 99217 PR OBSERVATION CARE DISCHARGE: CPT | Performed by: FAMILY MEDICINE

## 2022-11-23 PROCEDURE — 258N000003 HC RX IP 258 OP 636: Performed by: HOSPITALIST

## 2022-11-23 PROCEDURE — 99219 PR INITIAL OBSERVATION CARE,LEVEL II: CPT | Performed by: HOSPITALIST

## 2022-11-23 PROCEDURE — 97116 GAIT TRAINING THERAPY: CPT | Mod: GP

## 2022-11-23 PROCEDURE — 81003 URINALYSIS AUTO W/O SCOPE: CPT | Performed by: EMERGENCY MEDICINE

## 2022-11-23 PROCEDURE — 97166 OT EVAL MOD COMPLEX 45 MIN: CPT | Mod: GO

## 2022-11-23 PROCEDURE — 97535 SELF CARE MNGMENT TRAINING: CPT | Mod: GO

## 2022-11-23 PROCEDURE — 74177 CT ABD & PELVIS W/CONTRAST: CPT

## 2022-11-23 PROCEDURE — 96361 HYDRATE IV INFUSION ADD-ON: CPT

## 2022-11-23 PROCEDURE — 250N000011 HC RX IP 250 OP 636: Performed by: EMERGENCY MEDICINE

## 2022-11-23 PROCEDURE — G0378 HOSPITAL OBSERVATION PER HR: HCPCS

## 2022-11-23 PROCEDURE — 93306 TTE W/DOPPLER COMPLETE: CPT

## 2022-11-23 PROCEDURE — 93306 TTE W/DOPPLER COMPLETE: CPT | Mod: 26 | Performed by: INTERNAL MEDICINE

## 2022-11-23 PROCEDURE — 70450 CT HEAD/BRAIN W/O DYE: CPT

## 2022-11-23 PROCEDURE — 250N000013 HC RX MED GY IP 250 OP 250 PS 637: Performed by: HOSPITALIST

## 2022-11-23 PROCEDURE — U0005 INFEC AGEN DETEC AMPLI PROBE: HCPCS | Performed by: EMERGENCY MEDICINE

## 2022-11-23 PROCEDURE — 72125 CT NECK SPINE W/O DYE: CPT

## 2022-11-23 RX ORDER — ONDANSETRON 4 MG/1
4 TABLET, ORALLY DISINTEGRATING ORAL EVERY 6 HOURS PRN
Status: DISCONTINUED | OUTPATIENT
Start: 2022-11-23 | End: 2022-11-23 | Stop reason: HOSPADM

## 2022-11-23 RX ORDER — ONDANSETRON 2 MG/ML
4 INJECTION INTRAMUSCULAR; INTRAVENOUS EVERY 6 HOURS PRN
Status: DISCONTINUED | OUTPATIENT
Start: 2022-11-23 | End: 2022-11-23 | Stop reason: HOSPADM

## 2022-11-23 RX ORDER — FENTANYL CITRATE 50 UG/ML
50 INJECTION, SOLUTION INTRAMUSCULAR; INTRAVENOUS ONCE
Status: COMPLETED | OUTPATIENT
Start: 2022-11-23 | End: 2022-11-23

## 2022-11-23 RX ORDER — OXYCODONE HYDROCHLORIDE 5 MG/1
5 TABLET ORAL EVERY 4 HOURS PRN
Status: DISCONTINUED | OUTPATIENT
Start: 2022-11-23 | End: 2022-11-23 | Stop reason: HOSPADM

## 2022-11-23 RX ORDER — OXYCODONE AND ACETAMINOPHEN 5; 325 MG/1; MG/1
1 TABLET ORAL EVERY 6 HOURS PRN
COMMUNITY
End: 2023-10-05

## 2022-11-23 RX ORDER — IOPAMIDOL 755 MG/ML
90 INJECTION, SOLUTION INTRAVASCULAR ONCE
Status: COMPLETED | OUTPATIENT
Start: 2022-11-23 | End: 2022-11-23

## 2022-11-23 RX ORDER — SODIUM CHLORIDE 9 MG/ML
INJECTION, SOLUTION INTRAVENOUS CONTINUOUS
Status: DISCONTINUED | OUTPATIENT
Start: 2022-11-23 | End: 2022-11-23 | Stop reason: HOSPADM

## 2022-11-23 RX ORDER — ACETAMINOPHEN 325 MG/1
975 TABLET ORAL EVERY 8 HOURS PRN
Status: DISCONTINUED | OUTPATIENT
Start: 2022-11-23 | End: 2022-11-23 | Stop reason: HOSPADM

## 2022-11-23 RX ADMIN — ACETAMINOPHEN 975 MG: 325 TABLET, FILM COATED ORAL at 16:50

## 2022-11-23 RX ADMIN — OXYCODONE HYDROCHLORIDE 2.5 MG: 5 TABLET ORAL at 04:55

## 2022-11-23 RX ADMIN — IOPAMIDOL 90 ML: 755 INJECTION, SOLUTION INTRAVENOUS at 01:23

## 2022-11-23 RX ADMIN — SODIUM CHLORIDE: 9 INJECTION, SOLUTION INTRAVENOUS at 04:13

## 2022-11-23 RX ADMIN — FENTANYL CITRATE 50 MCG: 50 INJECTION, SOLUTION INTRAMUSCULAR; INTRAVENOUS at 00:33

## 2022-11-23 RX ADMIN — ACETAMINOPHEN 975 MG: 325 TABLET, FILM COATED ORAL at 07:56

## 2022-11-23 ASSESSMENT — ACTIVITIES OF DAILY LIVING (ADL)
ADLS_ACUITY_SCORE: 36
ADLS_ACUITY_SCORE: 35
ADLS_ACUITY_SCORE: 35
ADLS_ACUITY_SCORE: 36
ADLS_ACUITY_SCORE: 35

## 2022-11-23 NOTE — PROGRESS NOTES
Physical Therapy Discharge Summary    Reason for therapy discharge:    All goals and outcomes met, no further needs identified.    Progress towards therapy goal(s). See goals on Care Plan in [facility] electronic health record for goal details.  Goals met    Therapy recommendation(s):    No further therapy is recommended.

## 2022-11-23 NOTE — H&P
[facility] MEDICINE ADMISSION HISTORY AND PHYSICAL     Assessment & Plan       Dashawn Yeung is a 61 year old male who presented with complaints of loss of consciousness at home. Fell from a height of about 10ft earlier in the day. Injured left arm and left leg and was seen by orthoeladio with fractures of left wrist and ankle.    Initial evaluation revealed normal vital signs, overall unremarkable labs, CT head, chest, cervical spine with no vascular injury, no pneumothorax or pleural effusion, did show some soft tissue stranding and edema in the left gluteal fat representing contusion, no intracranial process or CT evidence of pathology.  EKG was sinus bradycardia.    Initial treatment included fentanyl, IV fluids.      Assessment plan:  Syncope  Unclear etiology, most likely orthostatic hypotension, vasovagal spell  Occurred in the evening after a significant fall off a ladder  Will monitor on telemetry  Check echocardiogram  Check orthostatic blood pressures  Treat with IV fluids  Hopefully will be able to go home later today if no concerning arrhythmia, echo normal    Left arm and ankle fractures  Sustained after a fall from ladder  Already saw [facility] orthopedics in clinic  Has an appointment with orthopedic surgery and follow-up on Monday  Tylenol, oxycodone as needed                                 DVTP: Lovenox prophylactic dose   Code Status: Full Code  Disposition: Observation   Expected LOS: 1 day  Goals for the hospitalization: Pain control, rule out concerning causes for syncope  Diet: Regular  Fluids: Normal saline at 100/h    Disposition Plan      Expected Discharge Date: 11/24/2022               Chief Complaint  passed out at home     HISTORY   Dashawn Yeung is a 61 year old male who presented with complaints of passing out at home.    Per ED provider:  Patient reports they felt faint and lightheaded this evening with a syncopal episode around 10 PM (2 hours ago)  "when they stood from a chair. The patient notes that around 3 PM (9 hours ago) they fell off a ladder from a second story (reported to be about 20 feet) and landed on their left side onto blacktop. The patient went to an orthopedic urgent care where they were diagnosed with a left wrist and ankle fracture. The patient denies hitting their head. The patient began to feel faint later in the night around 6 PM after getting home from orthopedics. The patient's wife reports that the patient was \"out of it\" and \"very clammy and sweaty\". They report that the patient was unable to say their name and was \"just moaning\". The patient notes that they tried to take their standing blood pressure at home, but \"couldn't stand that long\". The patient notes they were able to get a standing blood pressure while in the ED.  The patient took their prescribed oxycodone around 9:30 PM tonight. Right now the patient is complaining of left wrist and ankle pain with 2/10 pain to the left butt cheek as well. Otherwise they feel \"pretty good\" right now. The patient denies neck pain, back pain, chest pain, abdominal pain, or any other symptoms or complaints.   The patient endorses a bruise to the left butt cheek.  The patient denies smoking and drinks a \"little bit\" of alcohol.     Was putting on Vinicio lights when he fell off of his ladder.  No significant chest pain or shortness of breath currently.  Says that he lost consciousness after standing up and walking to the bathroom.  He said he has lost consciousness in the past related to injury or pain in the past.  Past Medical History     No past medical history on file.  No significant past medical history, not taking any medications other than supplements    Surgical History     Past Surgical History:   Procedure Laterality Date     APPENDECTOMY       AS KNEE SCOPE,MED/LAT MENISCUS REPAIR Right      VASECTOMY       Family History      Family History   Problem Relation Age of Onset     " "Hypertension Mother      Cerebrovascular Disease Father      Atrial fibrillation Sister      Pulmonary Embolism Sister      Other - See Comments Brother         \"heart problem\"      Social History      Social History     Tobacco Use     Smoking status: Never     Smokeless tobacco: Never   Vaping Use     Vaping Use: Never used   Substance Use Topics     Alcohol use: Yes     Alcohol/week: 7.0 standard drinks     Drug use: No      Allergies   No Known Allergies  Prior to Admission Medications      Prior to Admission Medications   Prescriptions Last Dose Informant Patient Reported? Taking?   aspirin 81 MG EC tablet   Yes No   Sig: [ASPIRIN 81 MG EC TABLET] Take 81 mg by mouth daily.   calcium carbonate-vitamin D2 500 mg(1,250mg) -200 unit tablet   Yes No   Sig: [CALCIUM CARBONATE-VITAMIN D2 500 MG(1,250MG) -200 UNIT TABLET] Take 1 tablet by mouth 2 (two) times a day.   fish oil-omega-3 fatty acids 300-1,000 mg capsule   Yes No   Sig: [FISH OIL-OMEGA-3 FATTY ACIDS 300-1,000 MG CAPSULE] Take 2 g by mouth daily.   multivitamin therapeutic (THERAGRAN) tablet   Yes No   Sig: [MULTIVITAMIN THERAPEUTIC (THERAGRAN) TABLET] Take 1 tablet by mouth daily.      Facility-Administered Medications: None      Review of Systems     A 12 point comprehensive review of systems was negative except as noted above in HPI.    PHYSICAL EXAMINATION     Vitals      Temp:  [97.5  F (36.4  C)] 97.5  F (36.4  C)  Pulse:  [63-75] 75  Resp:  [13-19] 13  BP: (110-126)/(69-77) 112/71  SpO2:  [95 %-98 %] 95 %    Examination   Physical Exam:    Gen: no acute distress, comfortable, alert, pleasant  ENT: no scleral icterus  Pulm: lungs are clear without wheezing, crackles, breathing comfortably at rest  CV: regular rate and rhythm, no significant lower extremity pitting edema, occasional PVCs  GI: abdomen is soft, non-tender, non-distended with active bowel sounds.  MSK: Left wrist and lower leg splinted  Derm: Not pale, no jaundice  Psych: appropriate " affect      Pertinent Radiology     Radiology Results:   Recent Results (from the past 24 hour(s))   CT Head w/o Contrast    Narrative    EXAM: CT HEAD W/O CONTRAST, CT CERVICAL SPINE W/O CONTRAST  LOCATION: [facility]  DATE/TIME: 11/23/2022 1:24 AM    INDICATION: Head and neck injury, syncope  COMPARISON: None.  TECHNIQUE:   1) Routine CT Head without IV contrast. Multiplanar reformats. Dose reduction techniques were used.  2) Routine CT Cervical Spine without IV contrast. Multiplanar reformats. Dose reduction techniques were used.    FINDINGS:   HEAD CT:   INTRACRANIAL CONTENTS: No intracranial hemorrhage, extraaxial collection, or mass effect.  No CT evidence of acute infarct. Normal parenchymal attenuation. Normal ventricles and sulci.     VISUALIZED ORBITS/SINUSES/MASTOIDS: No intraorbital abnormality. No paranasal sinus mucosal disease. No middle ear or mastoid effusion.    BONES/SOFT TISSUES: No acute abnormality.    CERVICAL SPINE CT:   VERTEBRA: Normal vertebral body heights and alignment. No fracture or posttraumatic subluxation.     CANAL/FORAMINA: Mild degenerative changes without significant canal narrowing at any level. No foraminal narrowing: Moderate left at C3-C4, mild to moderate bilateral at C7-T1.    PARASPINAL: No extraspinal abnormality. Visualized lung fields are clear.      Impression    IMPRESSION:  HEAD CT:  1.  No acute intracranial process.    CERVICAL SPINE CT:  1.  No CT evidence for acute fracture or post traumatic subluxation.   CT Cervical Spine w/o Contrast    Narrative    EXAM: CT HEAD W/O CONTRAST, CT CERVICAL SPINE W/O CONTRAST  LOCATION: [facility]  DATE/TIME: 11/23/2022 1:24 AM    INDICATION: Head and neck injury, syncope  COMPARISON: None.  TECHNIQUE:   1) Routine CT Head without IV contrast. Multiplanar reformats. Dose reduction techniques were used.  2) Routine CT Cervical Spine without IV contrast. Multiplanar reformats.  Dose reduction techniques were used.    FINDINGS:   HEAD CT:   INTRACRANIAL CONTENTS: No intracranial hemorrhage, extraaxial collection, or mass effect.  No CT evidence of acute infarct. Normal parenchymal attenuation. Normal ventricles and sulci.     VISUALIZED ORBITS/SINUSES/MASTOIDS: No intraorbital abnormality. No paranasal sinus mucosal disease. No middle ear or mastoid effusion.    BONES/SOFT TISSUES: No acute abnormality.    CERVICAL SPINE CT:   VERTEBRA: Normal vertebral body heights and alignment. No fracture or posttraumatic subluxation.     CANAL/FORAMINA: Mild degenerative changes without significant canal narrowing at any level. No foraminal narrowing: Moderate left at C3-C4, mild to moderate bilateral at C7-T1.    PARASPINAL: No extraspinal abnormality. Visualized lung fields are clear.      Impression    IMPRESSION:  HEAD CT:  1.  No acute intracranial process.    CERVICAL SPINE CT:  1.  No CT evidence for acute fracture or post traumatic subluxation.   CT Chest/Abdomen/Pelvis w Contrast    Narrative    EXAM: CT CHEST/ABDOMEN/PELVIS W CONTRAST  LOCATION: [facility]  DATE/TIME: 11/23/2022 1:33 AM    INDICATION: Traumatic injury. Pain. Fall 20 feet onto left side; pelvic pain with ecchymosis on left buttocks.  COMPARISON: X-ray pelvis AP view with left lateral hip 11/22/2022 at 1723 hours.  TECHNIQUE: CT scan of the chest, abdomen, and pelvis was performed following injection of IV contrast. Multiplanar reformats were obtained. Dose reduction techniques were used.   CONTRAST: 90 mL Isovue-370 IV.    FINDINGS:   LUNGS AND PLEURA: Minor dependent atelectasis posteriorly bilaterally. No pneumothorax or pleural effusion on either side.    MEDIASTINUM/AXILLAE: Normal cardiac size. Mild coronary artery calcifications. No pericardial effusion. Normal caliber thoracic aorta without aneurysm or dissection. Normal caliber great vessels arising from the thoracic arch. No obvious pulmonary  emboli   on either side. Trachea is midline.    CORONARY ARTERY CALCIFICATION: Mild.    HEPATOBILIARY: Mild diffuse fatty infiltration of the liver without discrete lesion, calcified gallstones or biliary dilatation. Patent portal veins.    PANCREAS: Normal.    SPLEEN: Normal splenic size. No discrete lesion. Small accessory splenule inferiorly.    ADRENAL GLANDS: Normal.    KIDNEYS/BLADDER: No urinary tract calculi. Both kidneys are well perfused without hydronephrosis or hydroureter. Normal urinary bladder. Mild prostatomegaly.    BOWEL: Scattered colonic diverticulosis, more apparent distally at the rectosigmoid, without acute inflammation. Scattered gas and stool material within normal caliber colon. No obstruction, free gas or free fluid. Stomach is distended with residual food   material and fluid.    LYMPH NODES: No suspicious abdominopelvic adenopathy.    VASCULATURE: Atherosclerotic normal caliber distal abdominal aorta measuring 1.9 x 1.9 cm (image 429, series 3). Normal caliber IVC.    PELVIC ORGANS: Rectosigmoid diverticulosis. Mild prostatomegaly. Phleboliths.    MUSCULOSKELETAL: Soft tissue stranding and edema in the left gluteal fat representing contusion. No fracture. Degenerative changes both shoulders, spine and joints of the pelvis.      Impression    IMPRESSION:  1.  No vascular injury in the chest. Minor dependent atelectasis in the lungs posteriorly bilaterally. No pneumothorax or pleural effusion on either side.    2.  No focal solid organ laceration, hollow visceral perforation or abdominopelvic free fluid. Fatty liver. Colonic diverticulosis, more apparent distally, without acute inflammation. Stomach is distended with residual food material and fluid. Mild   prostatomegaly.    3.  Soft tissue stranding and edema in the left gluteal fat representing contusion. No fracture. Degenerative changes both shoulders, spine and joints of the pelvis.     EKG Results: personally reviewed.     [facility] Planning        Jeff Pina, [facility]   Phone: #[phone]

## 2022-11-23 NOTE — DISCHARGE SUMMARY
[facility] MEDICINE  DISCHARGE SUMMARY     Primary Care Physician: Rakesh Rice  Admission Date: 11/22/2022   Discharge Provider: Chadd Sam MD Discharge Date: 11/23/2022   Diet:   Active Diet and Nourishment Order   Procedures     Regular Diet Adult     Diet       Code Status: Full Code   Activity: DCACTIVITY: Activity as tolerated        Condition at Discharge: Stable     REASON FOR PRESENTATION(See Admission Note for Details)   Syncope    PRINCIPAL & ACTIVE DISCHARGE DIAGNOSES     Syncope    PENDING LABS     Unresulted Labs Ordered in the Past 30 Days of this Admission     No orders found for last 31 day(s).            PROCEDURES ( this hospitalization only)          RECOMMENDATIONS TO OUTPATIENT PROVIDER FOR F/U VISIT     Follow-up Appointments     Follow-up and recommended labs and tests       Follow up with primary care provider, Rakesh Rice, within 7 days for   hospital follow- up.  The following labs/tests are recommended: Consider   event monitor if recurrent presyncopal or syncopal episodes.           DISPOSITION     Home    SUMMARY OF HOSPITAL COURSE:    HPI: 61-year-old male presented to the hospital after a syncopal episode at home.  He had fallen earlier in the day and had sustained a left arm and left leg injury with fractures.  He was admitted for further evaluation and treatment after a negative CT head chest and cervical spine.    Syncope  No recurrence here in the hospital.  Orthostatic blood pressures were fine and echocardiogram was unremarkable without signs of RV strain or LV dysfunction.  Had no significant valvular disease.  Patient was feeling significantly better after some fluids.  Requesting discharge from the hospital.  Follow-up with primary within 1 week.  Follow-up with orthopedics as directed.  If recurrent symptoms would recommend event monitor as an outpatient.    Left ankle and wrist fractures  Outpatient follow-up as stated in  orthopedic note/consult from today.  Nonweightbearing and patient will use scooter at home.  He demonstrated with PT and OT that he was capable prior to discharge.      Discharge Medications with Med changes:     Current Discharge Medication List      CONTINUE these medications which have NOT CHANGED    Details   oxyCODONE-acetaminophen (PERCOCET) 5-325 MG tablet Take 1 tablet by mouth every 6 hours as needed for severe pain (7-10)             Consults     PHYSICAL THERAPY ADULT IP CONSULT  OCCUPATIONAL THERAPY ADULT IP CONSULT  PHYSICAL THERAPY ADULT IP CONSULT    Immunizations given this encounter     Most Recent Immunizations   Administered Date(s) Administered     COVID-19 Vaccine 12+ (Pfizer 2022) 06/09/2022     COVID-19 Vaccine 12+ (Pfizer) 11/16/2021     COVID-19 Vaccine Bivalent Booster 12+ (Pfizer) 11/03/2022     Flu, Unspecified 10/21/2019     Influenza Vaccine 50-64 or 18-64 w/egg allergy (Flublok) 10/27/2022     Influenza,INJ,MDCK,PF,Quad >6mo(Flucelvax) 09/18/2020     Tdap (Adacel,Boostrix) 01/06/2020           Anticoagulation Information      Recent INR results:   Recent Labs   Lab 11/22/22  2349   INR 1.07     Warfarin doses (if applicable) or name of other anticoagulant:       SIGNIFICANT IMAGING FINDINGS     Results for orders placed or performed during the hospital encounter of 11/22/22   CT Head w/o Contrast    Impression    IMPRESSION:  HEAD CT:  1.  No acute intracranial process.    CERVICAL SPINE CT:  1.  No CT evidence for acute fracture or post traumatic subluxation.   CT Cervical Spine w/o Contrast    Impression    IMPRESSION:  HEAD CT:  1.  No acute intracranial process.    CERVICAL SPINE CT:  1.  No CT evidence for acute fracture or post traumatic subluxation.   CT Chest/Abdomen/Pelvis w Contrast    Impression    IMPRESSION:  1.  No vascular injury in the chest. Minor dependent atelectasis in the lungs posteriorly bilaterally. No pneumothorax or pleural effusion on either side.    2.  No  focal solid organ laceration, hollow visceral perforation or abdominopelvic free fluid. Fatty liver. Colonic diverticulosis, more apparent distally, without acute inflammation. Stomach is distended with residual food material and fluid. Mild   prostatomegaly.    3.  Soft tissue stranding and edema in the left gluteal fat representing contusion. No fracture. Degenerative changes both shoulders, spine and joints of the pelvis.   Echocardiogram Complete   Result Value Ref Range    LVEF  55-60%        SIGNIFICANT LABORATORY FINDINGS     Most Recent 3 CBC's:Recent Labs   Lab Test 11/22/22  2349 10/10/22  1134 01/06/20  0845   WBC 9.4 7.1 5.0   HGB 12.5* 14.3 15.6   MCV 91 94 93    212 223     Most Recent 3 BMP's:Recent Labs   Lab Test 11/22/22  2349 10/10/22  1134 09/22/22  0739    137 137   POTASSIUM 3.7 4.3 4.2   CHLORIDE 102 105 102   CO2 26 24 26   BUN 18 17 19.6   CR 1.13 1.06 1.17   ANIONGAP 9 8 9   ALESSANDRO 8.8 9.2 9.8   * 101 106*     Most Recent 2 LFT's:Recent Labs   Lab Test 11/22/22 2349 09/22/22  0739   AST 21 24   ALT 23 29   ALKPHOS 44* 46   BILITOTAL 2.9* 1.5*     Most Recent 3 INR's:Recent Labs   Lab Test 11/22/22 2349   INR 1.07         Discharge Orders        Reason for your hospital stay    Passed out from pain medication and dehydration.     Follow-up and recommended labs and tests     Follow up with primary care provider, Rakesh Rice, within 7 days for hospital follow- up.  The following labs/tests are recommended: Consider event monitor if recurrent presyncopal or syncopal episodes.     Activity    Your activity upon discharge: activity as tolerated     Diet    Follow this diet upon discharge: Orders Placed This Encounter      Regular Diet Adult       Examination   Physical Exam   Temp:  [97.4  F (36.3  C)-97.5  F (36.4  C)] 97.4  F (36.3  C)  Pulse:  [63-89] 89  Resp:  [13-21] 21  BP: (106-126)/(69-77) 114/73  SpO2:  [94 %-98 %] 96 %  Wt Readings from Last 1 Encounters:    10/10/22 90.3 kg (199 lb)       General Appearance: No apparent distress  Respiratory: Clear to auscultation bilaterally  Cardiovascular: Regular rate and rhythm  GI: Soft and nontender  Skin: Visualized skin is normal  Other: Good eye contact with normal affect requesting discharge      Please see EMR for more detailed significant labs, imaging, consultant notes etc.    IChadd MD, personally saw the patient today and spent greater than 30 minutes discharging this patient.    Chadd Sam MD  [facility]    CC:Rakesh Rice

## 2022-11-23 NOTE — PROGRESS NOTES
[facility]  OUTPATIENT PHYSICAL THERAPY EVALUATION  PLAN OF TREATMENT FOR OUTPATIENT REHABILITATION  (COMPLETE FOR INITIAL CLAIMS ONLY)  Patient's Last Name, First Name, M.I.  YOB: 1960  GamalDashawn SOSA                        Provider's Name  [facility] Medical Record No.  [id number]                             Onset Date:  11/22/22   Start of Care Date:  11/23/22   Type:     _X_PT   ___OT   ___SLP Medical Diagnosis:  Fractures of left wrist and ankle              PT Diagnosis:  Impaired functional mobility Visits from SOC:  1     See note for plan of treatment, functional goals and certification details    I CERTIFY THE NEED FOR THESE SERVICES FURNISHED UNDER        THIS PLAN OF TREATMENT AND WHILE UNDER MY CARE     (Physician co-signature of this document indicates review and certification of the therapy plan).                11/23/22 6975   Appointment Info   Signing Clinician's Name / Credentials (PT) Dulce Ruiz, PT, DPT   Quick Adds   Quick Adds Certification   Living Environment   People in Home spouse   Current Living Arrangements house   Home Accessibility stairs to enter home   Number of Stairs, Main Entrance 3   Stair Railings, Main Entrance none;other (see comments)  (Cabinet nearby to use as support)   Transportation Anticipated family or friend will provide   Living Environment Comments Pt lives in a rambler with his wife, 3 RIA through the garage. All needs met on one level.   Self-Care   Usual Activity Tolerance good   Current Activity Tolerance moderate   Equipment Currently Used at Home none   Fall history within last six months yes   Number of times patient has fallen within last six months 1   Activity/Exercise/Self-Care Comment Pt is independent with all mobility at baseline. Pt's wife is bringing him a knee scooter to use at home.   General Information   Onset of Illness/Injury or Date of Surgery 11/22/22  "  Referring Physician Chadd Sam MD   Patient/Family Therapy Goals Statement (PT) Return home   Pertinent History of Current Problem (include personal factors and/or comorbidities that impact the POC) Per chart, \"Dashawn Yeung is a 61 year old male who presented with complaints of loss of consciousness at home. Fell from a height of about 10ft earlier in the day. Injured left arm and left leg and was seen by ortho, dx'd with fractures of left wrist and ankle.\"   Existing Precautions/Restrictions fall;weight bearing   Weight-Bearing Status - LUE (S)  nonweight-bearing   Weight-Bearing Status - RUE full weight-bearing   Weight-Bearing Status - LLE (S)  nonweight-bearing   Weight-Bearing Status - RLE full weight-bearing   Posture    Posture Not impaired   Range of Motion (ROM)   Range of Motion ROM is WNL   ROM Comment RLE WNL; LLE splinted   Strength (Manual Muscle Testing)   Strength (Manual Muscle Testing) strength is WNL   Strength Comments RLE WNL; LLE splinted   Bed Mobility   Bed Mobility supine-sit;sit-supine   Supine-Sit Curry (Bed Mobility) independent   Sit-Supine [place] (Bed Mobility) independent   Transfers   Transfers sit-stand transfer   Maintains Weight-bearing Status (Transfers) able to maintain   Impairments Contributing to Impaired Transfers other (see comments)  (NWB LLE/LUE)   Sit-Stand Transfer   Sit-Stand Curry (Transfers) supervision;verbal cues   Assistive Device (Sit-Stand Transfers) walker, knee scooter   Gait/Stairs (Locomotion)   Curry Level (Gait) supervision;verbal cues   Assistive Device (Gait) walker, knee scooter   Distance in Feet (Required for LE Total Joints) 10'   Distance in Feet (Gait) 150'   Maintains Weight-bearing Status (Gait) able to maintain   Clinical Impression   Criteria for Skilled Therapeutic Intervention Yes, treatment indicated   PT Diagnosis (PT) Impaired functional mobility   Influenced by the following impairments NWB LLE/LLE "   Functional limitations due to impairments Transfers, gait   Clinical Presentation (PT Evaluation Complexity) Stable/Uncomplicated   Clinical Presentation Rationale Pt presents as medically diagnosed.   Clinical Decision Making (Complexity) low complexity   Planned Therapy Interventions (PT) gait training;patient/family education   Anticipated Equipment Needs at Discharge (PT) other (see comments)  (Knee scooter)   Risk & Benefits of therapy have been explained evaluation/treatment results reviewed;care plan/treatment goals reviewed;patient   PT Total Evaluation Time   PT Eval, Low Complexity Minutes (14191) 10   Therapy Certification   Start of care date 11/23/22   Certification date from 11/23/22   Certification date to 11/30/22   Medical Diagnosis Fractures of left wrist and ankle   Physical Therapy Goals   PT Frequency One time eval and treatment only   PT Predicted Duration/Target Date for Goal Attainment 11/23/22   PT Goals Transfers;Gait   PT: Transfers Modified independent;Sit to/from stand;Assistive device;Within precautions;Goal Met   PT: Gait Modified independent;Assistive device;Within precautions;150 feet;Goal Met   Interventions   Interventions Quick Adds Gait Training   Gait Training   Gait Training Minutes (69165) 10   Symptoms Noted During/After Treatment (Gait Training) none   Treatment Detail/Skilled Intervention Pt ambulated 150' total with SBA and knee scooter, able to maintain NWB LLE/LUE. Verbal cues for safe technique when turning and application of brakes when transferring on/off knee scooter. Mod I following education.   [place] Level (Gait Training) stand-by assist   Physical Assistance Level (Gait Training) supervision;verbal cues   Weight Bearing (Gait Training) nonweight-bearing  (LLE/LUE)   Assistive Device (Gait Training) other (see comments)  (Knee scooter)   PT Discharge Planning   PT Plan Discharge   PT Discharge Recommendation (DC Rec) (S)  home with assist   PT Rationale  for DC Rec Pt is able to transfer and ambulate safely with knee scooter while maintaining NWB LLE/LUE. Pt will be safe to discharge home with assist from his wife.   PT Brief overview of current status SBA for transfers and ambulation using knee scooter.   Total Session Time   Timed Code Treatment Minutes 10   Total Session Time (sum of timed and untimed services) 20

## 2022-11-23 NOTE — ED PROVIDER NOTES
EMERGENCY DEPARTMENT ENCOUnter      NAME: Dashawn Yeung  AGE: 61 year old male  YOB: 1960  MRN: [id number]  EVALUATION DATE & TIME: 11/22/2022 11:23 PM    PCP: Rakesh Rice    ED PROVIDER: Raina German MD      Chief Complaint   Patient presents with     near syncopal          FINAL IMPRESSION:  1. Syncope, unspecified syncope type    2. Wrist fracture, left, closed, initial encounter    3. Ankle fracture, left, closed, initial encounter    4. Contusion, buttock, initial encounter    5. Fall, initial encounter          ED COURSE & MEDICAL DECISION MAKING:      In summary, the patient is a 61-year-old male that presents to the emergency department for evaluation of fainting.  The patient fell approximately 20 feet earlier in the day and was seen at the orthopedic clinic and diagnosed with a left wrist and ankle fracture.  The patient had at least 2 episodes at home where he felt like he may faint and then he was sitting and fainted.  We will admit to the hospital for further care and evaluation.  [facility] was consulted in the emergency department and plans to consult on the patient later today.    11:45 PM I met with the patient and performed my initial interview and exam.  Fentanyl 50 mcg IV was administered for pain.  12:20 AM I spoke with [facility], Dr. Negro   12:43 AM I spoke with Dr. Negro, who does not require the patient to be x-rayed again.  They will see the patient later today to determine wheter surgical intervention is indicated.  1:58 AM I rechecked the patient.  His pain has improved and he declines further pain medication.  0205-discussed case with Dr. Pina and he accepts patient for admission.    At the conclusion of the encounter I discussed the results of all of the tests and the disposition. The questions were answered. The patient or family acknowledged understanding and was agreeable with the care plan.     Medical Decision  "Making    History:    Supplemental history from: Family Member/Significant Other    External Record(s) reviewed: unable to see orthopedic records, but orthopedic surgeon reviewed xrays    Work Up:    Chart documentation includes differential considered and any EKGs or imaging interpreted by provider.    External consultation:    Discussion of management with another provider: Orthopedics and hospitalist    Complicating factors:    Care impacted by chronic illness: None    Care affected by social determinants of health: N/A    Disposition considerations: Admit.      MEDICATIONS GIVEN IN THE EMERGENCY:  Medications   fentaNYL (PF) (SUBLIMAZE) injection 50 mcg (50 mcg Intravenous Given 11/23/22 0033)   iopamidol (ISOVUE-370) solution 90 mL (90 mLs Intravenous Given 11/23/22 0123)       NEW PRESCRIPTIONS STARTED AT TODAY'S ER VISIT  New Prescriptions    No medications on file          =================================================================    HPI        Dashawn Yeung is a 61 year old male with history of irregular heart beat (self reported) who presents to this ED via EMS for evaluation of syncope    Patient reports they felt faint and lightheaded this evening with a syncopal episode around 10 PM (2 hours ago) when they stood from a chair. The patient notes that around 3 PM (9 hours ago) they fell off a ladder from a second story (reported to be about 20 feet) and landed on their left side onto blacktop. The patient went to an orthopedic urgent care where they were diagnosed with a left wrist and ankle fracture. The patient denies hitting their head. The patient began to feel faint later in the night around 6 PM after getting home from orthopedics. The patient's wife reports that the patient was \"out of it\" and \"very clammy and sweaty\". They report that the patient was unable to say their name and was \"just moaning\". The patient notes that they tried to take their standing blood pressure at home, but " "\"couldn't stand that long\". The patient notes they were able to get a standing blood pressure while in the ED.  The patient took their prescribed oxycodone around 9:30 PM tonight. Right now the patient is complaining of left wrist and ankle pain with 2/10 pain to the left butt cheek as well. Otherwise they feel \"pretty good\" right now. The patient denies neck pain, back pain, chest pain, abdominal pain, or any other symptoms or complaints.   The patient endorses a bruise to the left butt cheek.  The patient denies smoking and drinks a \"little bit\" of alcohol.     REVIEW OF SYSTEMS     Constitutional:  Denies fever or chills  HENT:  Denies sore throat   Respiratory:  Denies cough or shortness of breath   Cardiovascular:  Denies chest pain or palpitations  GI:  Denies abdominal pain, nausea, or vomiting  Musculoskeletal: Positive for left wrist and ankle pain  Skin:  Denies rash. Positive for bruise to left butt cheek  Neurologic:  Denies headache, focal weakness or sensory changes. Positive for syncope   All other systems reviewed and are negative      PAST MEDICAL HISTORY:  History reviewed. No pertinent past medical history.    PAST SURGICAL HISTORY:  Past Surgical History:   Procedure Laterality Date     APPENDECTOMY       AS KNEE SCOPE,MED/LAT MENISCUS REPAIR Right      VASECTOMY             CURRENT MEDICATIONS:    aspirin 81 MG EC tablet  calcium carbonate-vitamin D2 500 mg(1,250mg) -200 unit tablet  fish oil-omega-3 fatty acids 300-1,000 mg capsule  multivitamin therapeutic (THERAGRAN) tablet        ALLERGIES:  No Known Allergies    FAMILY HISTORY:  Family History   Problem Relation Age of Onset     Hypertension Mother      Cerebrovascular Disease Father      Atrial fibrillation Sister      Pulmonary Embolism Sister      Other - See Comments Brother         \"heart problem\"       SOCIAL HISTORY:   Social History     Socioeconomic History     Marital status:      Spouse name: None     Number of children: " None     Years of education: None     Highest education level: None   Tobacco Use     Smoking status: Never     Smokeless tobacco: Never   Vaping Use     Vaping Use: Never used   Substance and Sexual Activity     Alcohol use: Yes     Alcohol/week: 7.0 standard drinks     Drug use: No     Sexual activity: Yes     Partners: Female     Birth control/protection: None       VITALS:  Patient Vitals for the past 24 hrs:   BP Temp Temp src Pulse Resp SpO2 Height   11/23/22 0115 -- -- -- 74 14 97 % --   11/23/22 0000 112/71 -- -- 74 19 98 % --   11/22/22 2331 116/69 -- -- -- -- -- --   11/22/22 2330 126/77 -- -- 67 -- 96 % --   11/22/22 2329 110/69 97.5  F (36.4  C) Oral 63 -- 96 % 1.829 m (6')       PHYSICAL EXAM    Constitutional:  Well developed, Well nourished,  HENT:  Normocephalic, Atraumatic, Bilateral external ears normal, Oropharynx moist, Nose normal.   Neck:  Normal range of motion, No meningismus, No stridor.   Eyes:  EOMI, Conjunctiva normal, No discharge.   Respiratory:  Normal breath sounds, No respiratory distress, No wheezing, No chest tenderness.   Cardiovascular:  Normal heart rate, Normal rhythm, No murmurs  GI:  Soft, No tenderness, No guarding, No CVA tenderness.   Musculoskeletal:  Neurovascularly intact distally, No edema, No tenderness, No cyanosis, Good range of motion in all major joints.  Left forearm and ankle are in a splint  Integument:  Warm, Dry, No erythema, No rash.  10 cm region of ecchymosis noted on patient's left buttock  Lymphatic:  No lymphadenopathy noted.   Neurologic:  Alert & oriented x 3, Normal motor function, Normal sensory function, No focal deficits noted.   Psychiatric:  Affect normal, Judgment normal, Mood normal.      LAB:  All pertinent labs reviewed and interpreted.  Results for orders placed or performed during the hospital encounter of 11/22/22   CT Head w/o Contrast    Impression    IMPRESSION:  HEAD CT:  1.  No acute intracranial process.    CERVICAL SPINE CT:  1.   No CT evidence for acute fracture or post traumatic subluxation.   CT Cervical Spine w/o Contrast    Impression    IMPRESSION:  HEAD CT:  1.  No acute intracranial process.    CERVICAL SPINE CT:  1.  No CT evidence for acute fracture or post traumatic subluxation.   CT Chest/Abdomen/Pelvis w Contrast    Impression    IMPRESSION:  1.  No vascular injury in the chest. Minor dependent atelectasis in the lungs posteriorly bilaterally. No pneumothorax or pleural effusion on either side.    2.  No focal solid organ laceration, hollow visceral perforation or abdominopelvic free fluid. Fatty liver. Colonic diverticulosis, more apparent distally, without acute inflammation. Stomach is distended with residual food material and fluid. Mild   prostatomegaly.    3.  Soft tissue stranding and edema in the left gluteal fat representing contusion. No fracture. Degenerative changes both shoulders, spine and joints of the pelvis.   Result Value Ref Range    INR 1.07 0.85 - 1.15   Comprehensive metabolic panel   Result Value Ref Range    Sodium 137 136 - 145 mmol/L    Potassium 3.7 3.5 - 5.0 mmol/L    Chloride 102 98 - 107 mmol/L    Carbon Dioxide (CO2) 26 22 - 31 mmol/L    Anion Gap 9 5 - 18 mmol/L    Urea Nitrogen 18 8 - 22 mg/dL    Creatinine 1.13 0.70 - 1.30 mg/dL    Calcium 8.8 8.5 - 10.5 mg/dL    Glucose 141 (H) 70 - 125 mg/dL    Alkaline Phosphatase 44 (L) 45 - 120 U/L    AST 21 0 - 40 U/L    ALT 23 0 - 45 U/L    Protein Total 6.7 6.0 - 8.0 g/dL    Albumin 3.9 3.5 - 5.0 g/dL    Bilirubin Total 2.9 (H) 0.0 - 1.0 mg/dL    GFR Estimate 75 >60 mL/min/1.73m2   Result Value Ref Range    Troponin I 0.01 0.00 - 0.29 ng/mL   Lactic acid whole blood   Result Value Ref Range    Lactic Acid 1.3 0.7 - 2.0 mmol/L   CBC with platelets and differential   Result Value Ref Range    WBC Count 9.4 4.0 - 11.0 10e3/uL    RBC Count 4.11 (L) 4.40 - 5.90 10e6/uL    Hemoglobin 12.5 (L) 13.3 - 17.7 g/dL    Hematocrit 37.5 (L) 40.0 - 53.0 %    MCV 91 78 -  100 fL    MCH 30.4 26.5 - 33.0 pg    MCHC 33.3 31.5 - 36.5 g/dL    RDW 12.5 10.0 - 15.0 %    Platelet Count 199 150 - 450 10e3/uL    % Neutrophils 77 %    % Lymphocytes 15 %    % Monocytes 6 %    % Eosinophils 1 %    % Basophils 0 %    % Immature Granulocytes 1 %    NRBCs per 100 WBC 0 <1 /100    Absolute Neutrophils 7.2 1.6 - 8.3 10e3/uL    Absolute Lymphocytes 1.4 0.8 - 5.3 10e3/uL    Absolute Monocytes 0.6 0.0 - 1.3 10e3/uL    Absolute Eosinophils 0.1 0.0 - 0.7 10e3/uL    Absolute Basophils 0.0 0.0 - 0.2 10e3/uL    Absolute Immature Granulocytes 0.1 <=0.4 10e3/uL    Absolute NRBCs 0.0 10e3/uL   Result Value Ref Range    aPTT 27 22 - 38 Seconds       RADIOLOGY:  I have independently reviewed and interpreted the above imaging, pending the final radiology read.  CT Chest/Abdomen/Pelvis w Contrast   Final Result   IMPRESSION:   1.  No vascular injury in the chest. Minor dependent atelectasis in the lungs posteriorly bilaterally. No pneumothorax or pleural effusion on either side.      2.  No focal solid organ laceration, hollow visceral perforation or abdominopelvic free fluid. Fatty liver. Colonic diverticulosis, more apparent distally, without acute inflammation. Stomach is distended with residual food material and fluid. Mild    prostatomegaly.      3.  Soft tissue stranding and edema in the left gluteal fat representing contusion. No fracture. Degenerative changes both shoulders, spine and joints of the pelvis.      CT Cervical Spine w/o Contrast   Final Result   IMPRESSION:   HEAD CT:   1.  No acute intracranial process.      CERVICAL SPINE CT:   1.  No CT evidence for acute fracture or post traumatic subluxation.      CT Head w/o Contrast   Final Result   IMPRESSION:   HEAD CT:   1.  No acute intracranial process.      CERVICAL SPINE CT:   1.  No CT evidence for acute fracture or post traumatic subluxation.          EK-rate-55, sinus, no st segment elevation or depression  I have independently  reviewed and interpreted this EKG          I, Nena Jaquez, am serving as a scribe to document services personally performed by Dr. German based on my observation and the provider's statements to me. I, Raina German MD attest that Nena Jaquez is acting in a scribe capacity, has observed my performance of the services and has documented them in accordance with my direction.    Raina German MD  Emergency Medicine  [facility] EMERGENCY ROOM  [id number] [facility] [id number] [phone]  Dept: [phone]     Raina German MD  11/23/22 0212

## 2022-11-23 NOTE — PROGRESS NOTES
[facility] Orthopaedics Chart Review    Dashawn Yeung,  1960, MRN [id number]     Admitting Dx: Fall, initial encounter [W19.XXXA]     PCP: Rakesh Rice, [phone]     Code status:  Full Code    Plan:  Follow up outpatient with [facility] as planned. No additional xrays needed per Dr. Alexandru Negro overnight.   Leave splints on, following previous weight bearing recommendations, until follow up Monday.  Continue using opioid medications as needed for pain management.    Thank you for your consult. Will sign off at this time. Please contact me as needed for additional questions.        HPI  We have been asked to review the orthopedic plan for Dashawn Yeung, a 61 year old year old male who fell from a ladder yesterday and was seen at [facility] around 1500 per hospital chart, diagnosed with a wrist and ankle fracture, splinted and sent home.   After arriving home home, he had episodes of lightheadedness and syncope. Patient was brought to ED via EMS per wife.   After admission to the ED, he has had no further episodes of LOC.      History is obtained from the electronic health record and colleagues.      Past Medical History  History reviewed. No pertinent past medical history.    Surgical History  Past Surgical History:   Procedure Laterality Date     APPENDECTOMY       AS KNEE SCOPE,MED/LAT MENISCUS REPAIR Right      VASECTOMY          Social History  Social History     Socioeconomic History     Marital status:      Spouse name: Not on file     Number of children: Not on file     Years of education: Not on file     Highest education level: Not on file   Occupational History     Not on file   Tobacco Use     Smoking status: Never     Smokeless tobacco: Never   Vaping Use     Vaping Use: Never used   Substance and Sexual Activity     Alcohol use: Yes     Alcohol/week: 7.0 standard drinks     Drug use: No     Sexual activity: Yes     Partners: Female     Birth control/protection:  "None   Other Topics Concern     Not on file   Social History Narrative     Not on file     Social Determinants of Health     Financial Resource Strain: Not on file   Food Insecurity: Not on file   Transportation Needs: Not on file   Physical Activity: Not on file   Stress: Not on file   Social Connections: Not on file   Intimate Partner Violence: Not on file   Housing Stability: Not on file       Family History  Family History   Problem Relation Age of Onset     Hypertension Mother      Cerebrovascular Disease Father      Atrial fibrillation Sister      Pulmonary Embolism Sister      Other - See Comments Brother         \"heart problem\"        Allergies:  Patient has no known allergies.      Current Medications:  Current Facility-Administered Medications   Medication     acetaminophen (TYLENOL) tablet 975 mg     melatonin tablet 3 mg     ondansetron (ZOFRAN ODT) ODT tab 4 mg    Or     ondansetron (ZOFRAN) injection 4 mg     oxyCODONE (ROXICODONE) tablet 5 mg     oxyCODONE IR (ROXICODONE) half-tab 2.5 mg     sodium chloride 0.9% infusion     Current Outpatient Medications   Medication Sig     oxyCODONE-acetaminophen (PERCOCET) 5-325 MG tablet Take 1 tablet by mouth every 6 hours as needed for severe pain (7-10)       Vital signs:  Temp:  [97.4  F (36.3  C)-97.5  F (36.4  C)] 97.4  F (36.3  C)  Pulse:  [63-87] 79  Resp:  [13-21] 14  BP: (106-126)/(69-77) 114/73  SpO2:  [94 %-98 %] 94 %    Pertinent Labs  Lab Results: personally reviewed.  Lab Results   Component Value Date    WBC 9.4 11/22/2022    HGB 12.5 (L) 11/22/2022    HCT 37.5 (L) 11/22/2022    MCV 91 11/22/2022     11/22/2022     Recent Labs   Lab 11/22/22  2349   INR 1.07       Pertinent Radiology  Radiology Results: images and radiology report reviewed  Recent Results (from the past 24 hour(s))   CT Head w/o Contrast    Narrative    EXAM: CT HEAD W/O CONTRAST, CT CERVICAL SPINE W/O CONTRAST  LOCATION: [facility]  DATE/TIME: " 11/23/2022 1:24 AM    INDICATION: Head and neck injury, syncope  COMPARISON: None.  TECHNIQUE:   1) Routine CT Head without IV contrast. Multiplanar reformats. Dose reduction techniques were used.  2) Routine CT Cervical Spine without IV contrast. Multiplanar reformats. Dose reduction techniques were used.    FINDINGS:   HEAD CT:   INTRACRANIAL CONTENTS: No intracranial hemorrhage, extraaxial collection, or mass effect.  No CT evidence of acute infarct. Normal parenchymal attenuation. Normal ventricles and sulci.     VISUALIZED ORBITS/SINUSES/MASTOIDS: No intraorbital abnormality. No paranasal sinus mucosal disease. No middle ear or mastoid effusion.    BONES/SOFT TISSUES: No acute abnormality.    CERVICAL SPINE CT:   VERTEBRA: Normal vertebral body heights and alignment. No fracture or posttraumatic subluxation.     CANAL/FORAMINA: Mild degenerative changes without significant canal narrowing at any level. No foraminal narrowing: Moderate left at C3-C4, mild to moderate bilateral at C7-T1.    PARASPINAL: No extraspinal abnormality. Visualized lung fields are clear.      Impression    IMPRESSION:  HEAD CT:  1.  No acute intracranial process.    CERVICAL SPINE CT:  1.  No CT evidence for acute fracture or post traumatic subluxation.   CT Cervical Spine w/o Contrast    Narrative    EXAM: CT HEAD W/O CONTRAST, CT CERVICAL SPINE W/O CONTRAST  LOCATION: [facility]  DATE/TIME: 11/23/2022 1:24 AM    INDICATION: Head and neck injury, syncope  COMPARISON: None.  TECHNIQUE:   1) Routine CT Head without IV contrast. Multiplanar reformats. Dose reduction techniques were used.  2) Routine CT Cervical Spine without IV contrast. Multiplanar reformats. Dose reduction techniques were used.    FINDINGS:   HEAD CT:   INTRACRANIAL CONTENTS: No intracranial hemorrhage, extraaxial collection, or mass effect.  No CT evidence of acute infarct. Normal parenchymal attenuation. Normal ventricles and sulci.      VISUALIZED ORBITS/SINUSES/MASTOIDS: No intraorbital abnormality. No paranasal sinus mucosal disease. No middle ear or mastoid effusion.    BONES/SOFT TISSUES: No acute abnormality.    CERVICAL SPINE CT:   VERTEBRA: Normal vertebral body heights and alignment. No fracture or posttraumatic subluxation.     CANAL/FORAMINA: Mild degenerative changes without significant canal narrowing at any level. No foraminal narrowing: Moderate left at C3-C4, mild to moderate bilateral at C7-T1.    PARASPINAL: No extraspinal abnormality. Visualized lung fields are clear.      Impression    IMPRESSION:  HEAD CT:  1.  No acute intracranial process.    CERVICAL SPINE CT:  1.  No CT evidence for acute fracture or post traumatic subluxation.   CT Chest/Abdomen/Pelvis w Contrast    Narrative    EXAM: CT CHEST/ABDOMEN/PELVIS W CONTRAST  LOCATION: [facility]  DATE/TIME: 11/23/2022 1:33 AM    INDICATION: Traumatic injury. Pain. Fall 20 feet onto left side; pelvic pain with ecchymosis on left buttocks.  COMPARISON: X-ray pelvis AP view with left lateral hip 11/22/2022 at 1723 hours.  TECHNIQUE: CT scan of the chest, abdomen, and pelvis was performed following injection of IV contrast. Multiplanar reformats were obtained. Dose reduction techniques were used.   CONTRAST: 90 mL Isovue-370 IV.    FINDINGS:   LUNGS AND PLEURA: Minor dependent atelectasis posteriorly bilaterally. No pneumothorax or pleural effusion on either side.    MEDIASTINUM/AXILLAE: Normal cardiac size. Mild coronary artery calcifications. No pericardial effusion. Normal caliber thoracic aorta without aneurysm or dissection. Normal caliber great vessels arising from the thoracic arch. No obvious pulmonary emboli   on either side. Trachea is midline.    CORONARY ARTERY CALCIFICATION: Mild.    HEPATOBILIARY: Mild diffuse fatty infiltration of the liver without discrete lesion, calcified gallstones or biliary dilatation. Patent portal  veins.    PANCREAS: Normal.    SPLEEN: Normal splenic size. No discrete lesion. Small accessory splenule inferiorly.    ADRENAL GLANDS: Normal.    KIDNEYS/BLADDER: No urinary tract calculi. Both kidneys are well perfused without hydronephrosis or hydroureter. Normal urinary bladder. Mild prostatomegaly.    BOWEL: Scattered colonic diverticulosis, more apparent distally at the rectosigmoid, without acute inflammation. Scattered gas and stool material within normal caliber colon. No obstruction, free gas or free fluid. Stomach is distended with residual food   material and fluid.    LYMPH NODES: No suspicious abdominopelvic adenopathy.    VASCULATURE: Atherosclerotic normal caliber distal abdominal aorta measuring 1.9 x 1.9 cm (image 429, series 3). Normal caliber IVC.    PELVIC ORGANS: Rectosigmoid diverticulosis. Mild prostatomegaly. Phleboliths.    MUSCULOSKELETAL: Soft tissue stranding and edema in the left gluteal fat representing contusion. No fracture. Degenerative changes both shoulders, spine and joints of the pelvis.      Impression    IMPRESSION:  1.  No vascular injury in the chest. Minor dependent atelectasis in the lungs posteriorly bilaterally. No pneumothorax or pleural effusion on either side.    2.  No focal solid organ laceration, hollow visceral perforation or abdominopelvic free fluid. Fatty liver. Colonic diverticulosis, more apparent distally, without acute inflammation. Stomach is distended with residual food material and fluid. Mild   prostatomegaly.    3.  Soft tissue stranding and edema in the left gluteal fat representing contusion. No fracture. Degenerative changes both shoulders, spine and joints of the pelvis.        Jovan Allan, NP

## 2022-11-23 NOTE — PROGRESS NOTES
11/23/22 1403   Appointment Info   Signing Clinician's Name / Credentials (OT) ESTEBAN Caal   Living Environment   People in Home spouse   Current Living Arrangements house   Transportation Anticipated family or friend will provide   Living Environment Comments Pt has knee scooter at home that wife is bringing. Pt has walkin shower w/ shower chair, standard toilet   Self-Care   Usual Activity Tolerance good   Current Activity Tolerance moderate   Equipment Currently Used at Home none   Fall history within last six months yes   Number of times patient has fallen within last six months 1   Activity/Exercise/Self-Care Comment Pt IND w/ ADLs and IADLs at baseline   General Information   Onset of Illness/Injury or Date of Surgery 11/22/22   Referring Physician Chadd Sam MD   Additional Occupational Profile Info/Pertinent History of Current Problem L ankle and L wrist fx   Existing Precautions/Restrictions weight bearing   Left Upper Extremity (Weight-bearing Status) (S)  non weight-bearing (NWB)  (wrist)   Left Lower Extremity (Weight-bearing Status) (S)  non weight-bearing (NWB)   Cognitive Status Examination   Orientation Status orientation to person, place and time   Visual Perception   Visual Impairment/Limitations corrective lenses full-time   Sensory   Sensory Quick Adds sensation intact   Pain Assessment   Patient Currently in Pain Yes, see Vital Sign flowsheet   Posture   Posture not impaired   Range of Motion Comprehensive   General Range of Motion no range of motion deficits identified   Strength Comprehensive (MMT)   General Manual Muscle Testing (MMT) Assessment no strength deficits identified   Muscle Tone Assessment   Muscle Tone Quick Adds No deficits were identified   Coordination   Upper Extremity Coordination No deficits were identified   Bed Mobility   Bed Mobility No deficits identified   Transfers   Transfers bed-chair transfer;toilet transfer;shower transfer   Transfer Comments  SBA-CGA for transfers   Activities of Daily Living   BADL Assessment/Intervention bathing;toileting   Bathing Assessment/Intervention   [place] Level (Bathing) contact guard assist   Toileting   [place] Level (Toileting) supervision   Clinical Impression   Criteria for Skilled Therapeutic Interventions Met (OT) Yes, treatment indicated   OT Diagnosis decreased ADLs   Influenced by the following impairments ankle/wrist fx   OT Problem List-Impairments impacting ADL activity tolerance impaired;mobility;pain   Assessment of Occupational Performance 3-5 Performance Deficits   Identified Performance Deficits transfers, toileting, LE dressing   Planned Therapy Interventions (OT) ADL retraining;transfer training   Clinical Decision Making Complexity (OT) moderate complexity   Risk & Benefits of therapy have been explained evaluation/treatment results reviewed;patient   OT Total Evaluation Time   OT Eval, Moderate Complexity Minutes (99146) 10   OT Goals   Therapy Frequency (OT) One time eval and treatment   OT Predicted Duration/Target Date for Goal Attainment 11/23/22   OT Goals Lower Body Dressing;Toilet Transfer/Toileting   OT: Lower Body Dressing Modified independent;within precautions;Goal Met;Completed   OT: Toilet Transfer/Toileting Modified independent;toilet transfer;cleaning and garment management;within precautions;Goal Met;Completed   Interventions   Interventions Quick Adds Self-Care/Home Management   Self-Care/Home Management   Self-Care/Home Mgmt/ADL, Compensatory, Meal Prep Minutes (01884) 15   Symptoms Noted During/After Treatment (Meal Preparation/Planning Training) none   Treatment Detail/Skilled Intervention Co-eval w/ PT. Pt edu on NWB status of L wrist and L ankle - pt verbalized understanding and followed px throughout session. Pt edu on compensatory strategies for Le dressing - completed Mod I while seated. STS Mod I and used knee scooter to amb. in hallway. Pt edu on safety technique for  safe toilet transfer - completed Mod I and toileted Mod I w/in px. No LOB, dizziness, or lightheadedness throughout session. Pt ended session back in bed   OT Discharge Planning   OT Plan DC OT   OT Discharge Recommendation (DC Rec) (S)  home with assist   OT Rationale for DC Rec Pt tolerating therapy well. Pt able to complete ADLs w/in NWB px using knee scooter. Pt will have assistance w/ IADLs from spouse at home. Pt has all necessary DME for home - declined RTS   OT Brief overview of current status Mod I w/ ADLs   Total Session Time   Timed Code Treatment Minutes 15   Total Session Time (sum of timed and untimed services) 25

## 2022-11-23 NOTE — PLAN OF CARE
Occupational Therapy Discharge Summary    Reason for therapy discharge:    All goals and outcomes met, no further needs identified.    Progress towards therapy goal(s). See goals on Care Plan in [facility] electronic health record for goal details.  Goals met    Therapy recommendation(s):    No further therapy is recommended.

## 2022-11-23 NOTE — ED TRIAGE NOTES
Pt arrives via EMS after a syncopal episode at home. Pt was seen  at [facility] urgent care after a 10 ft fall that occurred at 1500 this afternoon. Dx with a L-arm & L-ankle fx. Denies hitting head, LOC and blood thinners (babyu aspirin only). Pt took 325-5 percocet at 2130 for pain, at 2210 pt attempted to stand to brush teeth and fainted into a chair.     BG      Triage Assessment       Row Name 11/22/22 8458       Triage Assessment (Adult)    Airway WDL WDL       Respiratory WDL    Respiratory WDL WDL       Cardiac WDL    Cardiac WDL WDL       Peripheral/Neurovascular WDL    Peripheral Neurovascular WDL WDL       Cognitive/Neuro/Behavioral WDL    Cognitive/Neuro/Behavioral WDL WDL

## 2022-11-23 NOTE — PLAN OF CARE
Goal Outcome Evaluation:  Nurse reviewed discharge instructions with pt and his wife. Especially discussed in depth the total amount of Acetaminophen per day, given that his home prescription for pain med is Percocet. He understands the importance of watching these dosages. He understands he will return to [facility] for urgent fracture-related concerns, and to the ED for syncopal or cardiac or other issues. They stated they had all of his belongings. Nurse escorted pt on his scooter to his wife's car.

## 2022-11-23 NOTE — PLAN OF CARE
Goal Outcome Evaluation:    Pt A&O x 4, vitals stable on room air. Only complaint is moderate pain to left wrist/left ankle with known fractures to these joints. PRN oxycodone 2.5 mg given x 1 with some relief. NS infusing at 100 mL/hr. Tolerating regular diet,

## 2022-11-23 NOTE — PHARMACY-ADMISSION MEDICATION HISTORY
Pharmacy Note - Admission Medication History    Pertinent Provider Information: none     ______________________________________________________________________    Prior To Admission (PTA) med list completed and updated in EMR.       PTA Med List   Medication Sig Last Dose     oxyCODONE-acetaminophen (PERCOCET) 5-325 MG tablet Take 1 tablet by mouth every 6 hours as needed for severe pain (7-10) 11/22/2022       Information source(s): Patient  Method of interview communication: in-person    Summary of Changes to PTA Med List  New: all      Patient was asked about OTC/herbal products specifically.  PTA med list reflects this.        The information provided in this note is only as accurate as the sources available at the time of the update(s).    Thank you for the opportunity to participate in the care of this patient.    Zander Arias RPH  11/23/2022 7:28 AM

## 2022-11-28 ENCOUNTER — TELEPHONE (OUTPATIENT)
Dept: FAMILY MEDICINE | Facility: CLINIC | Age: 62
End: 2022-11-28

## 2022-11-28 ENCOUNTER — TRANSFERRED RECORDS (OUTPATIENT)
Dept: HEALTH INFORMATION MANAGEMENT | Facility: CLINIC | Age: 62
End: 2022-11-28

## 2022-11-28 NOTE — TELEPHONE ENCOUNTER
Pt came into clinic and will be having surgery this Thursday 12/1/2022.  I was unable to find appt. time for pt to be seen at this location for pre-op. I recommended calling scheduling line to try and get appt at different clinic.    Pt asked that I make a request to see if there is any option to be seen by VIRAL Rice prior to Surgery. Please call if there is cancellation or provider able to accommodate.   It is ok to leave detailed message.  [phone] (Mobile)

## 2022-11-29 NOTE — TELEPHONE ENCOUNTER
Pt notified to keep appointment for tomorrow. If there's any cancellations due to the snow today, I will call him.

## 2022-11-30 ENCOUNTER — OFFICE VISIT (OUTPATIENT)
Dept: FAMILY MEDICINE | Facility: CLINIC | Age: 62
End: 2022-11-30
Payer: COMMERCIAL

## 2022-11-30 VITALS
BODY MASS INDEX: 26.41 KG/M2 | RESPIRATION RATE: 14 BRPM | DIASTOLIC BLOOD PRESSURE: 72 MMHG | HEIGHT: 72 IN | HEART RATE: 59 BPM | WEIGHT: 195 LBS | OXYGEN SATURATION: 95 % | SYSTOLIC BLOOD PRESSURE: 112 MMHG | TEMPERATURE: 98.1 F

## 2022-11-30 DIAGNOSIS — Z12.11 SCREEN FOR COLON CANCER: ICD-10-CM

## 2022-11-30 DIAGNOSIS — S62.102D CLOSED FRACTURE OF LEFT WRIST WITH ROUTINE HEALING, SUBSEQUENT ENCOUNTER: ICD-10-CM

## 2022-11-30 DIAGNOSIS — R17 ELEVATED BILIRUBIN: ICD-10-CM

## 2022-11-30 DIAGNOSIS — S82.892D CLOSED FRACTURE OF LEFT ANKLE WITH ROUTINE HEALING, SUBSEQUENT ENCOUNTER: ICD-10-CM

## 2022-11-30 DIAGNOSIS — Z01.818 PREOP GENERAL PHYSICAL EXAM: Primary | ICD-10-CM

## 2022-11-30 LAB
ALBUMIN SERPL BCG-MCNC: 4.4 G/DL (ref 3.5–5.2)
ALP SERPL-CCNC: 58 U/L (ref 40–129)
ALT SERPL W P-5'-P-CCNC: 41 U/L (ref 10–50)
ANION GAP SERPL CALCULATED.3IONS-SCNC: 11 MMOL/L (ref 7–15)
AST SERPL W P-5'-P-CCNC: 26 U/L (ref 10–50)
BILIRUB SERPL-MCNC: 2.7 MG/DL
BUN SERPL-MCNC: 15.6 MG/DL (ref 8–23)
CALCIUM SERPL-MCNC: 9.5 MG/DL (ref 8.6–10)
CHLORIDE SERPL-SCNC: 100 MMOL/L (ref 98–107)
CREAT SERPL-MCNC: 1.06 MG/DL (ref 0.67–1.17)
DEPRECATED HCO3 PLAS-SCNC: 27 MMOL/L (ref 22–29)
ERYTHROCYTE [DISTWIDTH] IN BLOOD BY AUTOMATED COUNT: 12.8 % (ref 10–15)
GFR SERPL CREATININE-BSD FRML MDRD: 81 ML/MIN/1.73M2
GLUCOSE SERPL-MCNC: 102 MG/DL (ref 70–99)
HCT VFR BLD AUTO: 38.3 % (ref 40–53)
HGB BLD-MCNC: 12.8 G/DL (ref 13.3–17.7)
MCH RBC QN AUTO: 30.8 PG (ref 26.5–33)
MCHC RBC AUTO-ENTMCNC: 33.4 G/DL (ref 31.5–36.5)
MCV RBC AUTO: 92 FL (ref 78–100)
PLATELET # BLD AUTO: 242 10E3/UL (ref 150–450)
POTASSIUM SERPL-SCNC: 4.1 MMOL/L (ref 3.4–5.3)
PROT SERPL-MCNC: 7.1 G/DL (ref 6.4–8.3)
RBC # BLD AUTO: 4.16 10E6/UL (ref 4.4–5.9)
SODIUM SERPL-SCNC: 138 MMOL/L (ref 136–145)
WBC # BLD AUTO: 6.8 10E3/UL (ref 4–11)

## 2022-11-30 PROCEDURE — 36415 COLL VENOUS BLD VENIPUNCTURE: CPT | Performed by: FAMILY MEDICINE

## 2022-11-30 PROCEDURE — 80053 COMPREHEN METABOLIC PANEL: CPT | Performed by: FAMILY MEDICINE

## 2022-11-30 PROCEDURE — 99214 OFFICE O/P EST MOD 30 MIN: CPT | Performed by: FAMILY MEDICINE

## 2022-11-30 PROCEDURE — 85027 COMPLETE CBC AUTOMATED: CPT | Performed by: FAMILY MEDICINE

## 2022-11-30 RX ORDER — ASPIRIN 81 MG/1
81 TABLET ORAL DAILY
COMMUNITY

## 2022-11-30 ASSESSMENT — PAIN SCALES - GENERAL: PAINLEVEL: MILD PAIN (2)

## 2022-11-30 NOTE — PROGRESS NOTES
[facility]  [address],   [place] [facility] [place] [address]  Phone: [phone]  Fax: [phone]  Primary Provider: Rakesh Rice  Pre-op Performing Provider: MOOKIE KRUGER      PREOPERATIVE EVALUATION:  Today's date: 11/30/2022    Dashawn Yeung is a 61 year old male who presents for a preoperative evaluation.    Surgical Information:  Surgery/Procedure: Left ankle CHRISTINE F and left wrist repair  Surgery Location: [facility]  Surgeon: Dr. Ponce Fry  Surgery Date: 12/1/22  Time of Surgery: 3pm  Where patient plans to recover: At home with family  Fax number for surgical facility: [phone]    Type of Anesthesia Anticipated: General    Assessment & Plan     The proposed surgical procedure is considered LOW risk.    Preop general physical exam  -Medically optimized for the procedure at this time.  The patient is unclear about his COVID testing needs, he will clarify this with the surgery center.  Discussed that most of them are doing just a home COVID test the night prior to the procedure.  Hemoglobin was slightly under normal when he was in the hospital last week, will update this again today.  He is on no scheduled medications with the exception of Tylenol right now, no adjustments needed for this.  -He did have an evaluation for cardiac concerns while he is in the hospital last week including an echocardiogram which was normal and an EKG.  He has normal exercise tolerance, no further evaluation prior to his procedure.  His syncopal event sounds vasovagal to me, likely secondary to pain.  - CBC with platelets  - Comprehensive metabolic panel (BMP + Alb, Alk Phos, ALT, AST, Total. Bili, TP)  - CBC with platelets  - Comprehensive metabolic panel (BMP + Alb, Alk Phos, ALT, AST, Total. 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         Risks and Recommendations:  The patient has the following additional risks and recommendations for perioperative complications:   - No identified additional risk factors other than previously addressed    Medication Instructions:  Patient is on no chronic medications    RECOMMENDATION:  APPROVAL GIVEN to proceed with proposed procedure, without further diagnostic evaluation.            Subjective     HPI related to upcoming procedure:     He is here today for a preop. He is going to have a left wrist repair and left ankle repair.     His ladder fell when he was putting up [employer] lights, he did fall onto the ladder on his left side. He did not lose consciousness. He knew he had an issue with the ankle and wrist and sat on ice for 30 minutes. He was going to go to the orthopedic urgent care and had them looked at and had fractures.     He did have surgery on his right knee last summer and did well with this.     He was evaluated in the orthopedic urgent care, went home and following this and later that day had a syncopal event. He went to the bathroom to brush his teeth, did have to sit down, did have to pass out with this. He did have head/neck/cervical and pelvis and this was all ok. He was having some pain, he does think that he was dehydrated as well. He was having concentrated urine at the time. He has been taking mainly tylenol now for pain.     Preop Questions 11/29/2022   1. Have you ever had a heart attack or stroke? No   2. Have you ever had surgery on your heart or blood vessels, such as a stent placement, a coronary artery bypass, or surgery on an artery in your head, neck, heart, or legs? No   3. Do you have  chest pain with activity? No   4. Do you have a history of  heart failure? No   5. Do you currently have a cold, bronchitis or symptoms of other infection? No   6. Do you have a cough, shortness of breath, or wheezing? No   7. Do you or anyone in your family have previous history of blood clots? YES - Sister had blood clots in legs last year   8. Do you or does anyone in your family have a serious bleeding problem such as prolonged bleeding following surgeries or cuts? No   9. Have you ever had problems with anemia or been told to take iron pills? No   10. Have you had any abnormal blood loss such as black, tarry or bloody stools? No   11. Have you ever had a blood transfusion? No   12. Are you willing to have a blood transfusion if it is medically needed before, during, or after your surgery? Yes   13. Have you or any of your relatives ever had problems with anesthesia? No   14. Do you have sleep apnea, excessive snoring or daytime drowsiness? No   15. Do you have any artifical heart valves or other implanted medical devices like a pacemaker, defibrillator, or continuous glucose monitor? No   16. Do you have artificial joints? No   17. Are you allergic to latex? No       Health Care Directive:  Patient does not have a Health Care Directive or Living Will: Patient states has Advance Directive and will bring in a copy to clinic.    Preoperative Review of :   reviewed - controlled substances reflected in medication list.        Review of Systems  Constitutional, neuro, ENT, endocrine, pulmonary, cardiac, gastrointestinal, genitourinary, musculoskeletal, integument and psychiatric systems are negative, except as otherwise noted.    Patient Active Problem List    Diagnosis Date Noted     Elevated bilirubin 09/22/2022     Priority: Medium      History reviewed. No pertinent past medical history.  Past Surgical History:   Procedure Laterality Date     APPENDECTOMY       AS KNEE SCOPE,MED/LAT MENISCUS REPAIR Right   "    VASECTOMY       Current Outpatient Medications   Medication Sig Dispense Refill     aspirin 81 MG EC tablet Take 81 mg by mouth daily       oxyCODONE-acetaminophen (PERCOCET) 5-325 MG tablet Take 1 tablet by mouth every 6 hours as needed for severe pain (7-10) (Patient not taking: Reported on 11/30/2022)         No Known Allergies     Social History     Tobacco Use     Smoking status: Never     Smokeless tobacco: Never   Substance Use Topics     Alcohol use: Yes     Alcohol/week: 7.0 standard drinks     Family History   Problem Relation Age of Onset     Hypertension Mother      Cerebrovascular Disease Father      Atrial fibrillation Sister      Deep Vein Thrombosis Sister      Other - See Comments Brother         \"heart problem\"     History   Drug Use No         Objective     /72   Pulse 59   Temp 98.1  F (36.7  C)   Resp 14   Ht 1.829 m (6')   Wt 88.5 kg (195 lb)   SpO2 95%   BMI 26.45 kg/m      Physical Exam    GENERAL APPEARANCE: healthy, alert and no distress     EYES: EOMI,  PERRL     HENT: ear canals and TM's normal and nose and mouth without ulcers or lesions     NECK: no adenopathy, no asymmetry, masses, or scars and thyroid normal to palpation     RESP: lungs clear to auscultation - no rales, rhonchi or wheezes     CV: regular rates and rhythm, normal S1 S2, no S3 or S4 and no murmur, click or rub     ABDOMEN:  soft, nontender, no HSM or masses and bowel sounds normal     MS: extremities normal- no gross deformities noted, no evidence of inflammation in joints, FROM in all extremities.     MS: left wrist and left ankle in splints     SKIN: no suspicious lesions or rashes     NEURO: Normal strength and tone, sensory exam grossly normal, mentation intact and speech normal     PSYCH: mentation appears normal. and affect normal/bright     LYMPHATICS: No cervical adenopathy    Recent Labs   Lab Test 11/22/22  2349 10/10/22  1134   HGB 12.5* 14.3    212   INR 1.07  --     137 "   POTASSIUM 3.7 4.3   CR 1.13 1.06        Diagnostics:  Recent Results (from the past 240 hour(s))   ECG 12-LEAD WITH MUSE (LHE)    Collection Time: 11/22/22 11:37 PM   Result Value Ref Range    Systolic Blood Pressure 116 mmHg    Diastolic Blood Pressure 69 mmHg    Ventricular Rate 55 BPM    Atrial Rate 55 BPM    AL Interval 174 ms    QRS Duration 84 ms     ms    QTc 397 ms    P Axis 33 degrees    R AXIS 10 degrees    T Axis -6 degrees    Interpretation ECG       Sinus bradycardia  Minimal voltage criteria for LVH, may be normal variant  Septal infarct , age undetermined  Abnormal ECG  When compared with ECG of 22-FEB-2021 14:04,  Premature ventricular complexes are no longer Present  Confirmed by SEE ED PROVIDER NOTE FOR, ECG INTERPRETATION ([id number]),  TIGIST CASTANO ([id number]) on 11/23/2022 8:20:59 AM     INR    Collection Time: 11/22/22 11:49 PM   Result Value Ref Range    INR 1.07 0.85 - 1.15   Comprehensive metabolic panel    Collection Time: 11/22/22 11:49 PM   Result Value Ref Range    Sodium 137 136 - 145 mmol/L    Potassium 3.7 3.5 - 5.0 mmol/L    Chloride 102 98 - 107 mmol/L    Carbon Dioxide (CO2) 26 22 - 31 mmol/L    Anion Gap 9 5 - 18 mmol/L    Urea Nitrogen 18 8 - 22 mg/dL    Creatinine 1.13 0.70 - 1.30 mg/dL    Calcium 8.8 8.5 - 10.5 mg/dL    Glucose 141 (H) 70 - 125 mg/dL    Alkaline Phosphatase 44 (L) 45 - 120 U/L    AST 21 0 - 40 U/L    ALT 23 0 - 45 U/L    Protein Total 6.7 6.0 - 8.0 g/dL    Albumin 3.9 3.5 - 5.0 g/dL    Bilirubin Total 2.9 (H) 0.0 - 1.0 mg/dL    GFR Estimate 75 >60 mL/min/1.73m2   Troponin I    Collection Time: 11/22/22 11:49 PM   Result Value Ref Range    Troponin I 0.01 0.00 - 0.29 ng/mL   Lactic acid whole blood    Collection Time: 11/22/22 11:49 PM   Result Value Ref Range    Lactic Acid 1.3 0.7 - 2.0 mmol/L   CBC with platelets and differential    Collection Time: 11/22/22 11:49 PM   Result Value Ref Range    WBC Count 9.4 4.0 - 11.0 10e3/uL    RBC Count 4.11 (L)  4.40 - 5.90 10e6/uL    Hemoglobin 12.5 (L) 13.3 - 17.7 g/dL    Hematocrit 37.5 (L) 40.0 - 53.0 %    MCV 91 78 - 100 fL    MCH 30.4 26.5 - 33.0 pg    MCHC 33.3 31.5 - 36.5 g/dL    RDW 12.5 10.0 - 15.0 %    Platelet Count 199 150 - 450 10e3/uL    % Neutrophils 77 %    % Lymphocytes 15 %    % Monocytes 6 %    % Eosinophils 1 %    % Basophils 0 %    % Immature Granulocytes 1 %    NRBCs per 100 WBC 0 <1 /100    Absolute Neutrophils 7.2 1.6 - 8.3 10e3/uL    Absolute Lymphocytes 1.4 0.8 - 5.3 10e3/uL    Absolute Monocytes 0.6 0.0 - 1.3 10e3/uL    Absolute Eosinophils 0.1 0.0 - 0.7 10e3/uL    Absolute Basophils 0.0 0.0 - 0.2 10e3/uL    Absolute Immature Granulocytes 0.1 <=0.4 10e3/uL    Absolute NRBCs 0.0 10e3/uL   PTT    Collection Time: 11/22/22 11:49 PM   Result Value Ref Range    aPTT 27 22 - 38 Seconds   Asymptomatic COVID-19 Virus (Coronavirus) by PCR Nasopharyngeal    Collection Time: 11/23/22  2:11 AM    Specimen: Nasopharyngeal; Swab   Result Value Ref Range    SARS CoV2 PCR Negative Negative   UA with Microscopic reflex to Culture    Collection Time: 11/23/22  2:56 AM    Specimen: Urine, Midstream   Result Value Ref Range    Color Urine Light Yellow Colorless, Straw, Light Yellow, Yellow    Appearance Urine Clear Clear    Glucose Urine Negative Negative mg/dL    Bilirubin Urine Negative Negative    Ketones Urine Negative Negative mg/dL    Specific Gravity Urine >1.050 (H) 1.001 - 1.030    Blood Urine Negative Negative    pH Urine 7.0 5.0 - 7.0    Protein Albumin Urine 10 (A) Negative mg/dL    Urobilinogen Urine <2.0 <2.0 mg/dL    Nitrite Urine Negative Negative    Leukocyte Esterase Urine Negative Negative    Mucus Urine Present (A) None Seen /LPF    RBC Urine 0 <=2 /HPF    WBC Urine 0 <=5 /HPF   Echocardiogram Complete    Collection Time: 11/23/22  8:51 AM   Result Value Ref Range    LVEF  55-60%    CBC with platelets    Collection Time: 11/30/22 10:04 AM   Result Value Ref Range    WBC Count 6.8 4.0 - 11.0  10e3/uL    RBC Count 4.16 (L) 4.40 - 5.90 10e6/uL    Hemoglobin 12.8 (L) 13.3 - 17.7 g/dL    Hematocrit 38.3 (L) 40.0 - 53.0 %    MCV 92 78 - 100 fL    MCH 30.8 26.5 - 33.0 pg    MCHC 33.4 31.5 - 36.5 g/dL    RDW 12.8 10.0 - 15.0 %    Platelet Count 242 150 - 450 10e3/uL        Revised Cardiac Risk Index (RCRI):  The patient has the following serious cardiovascular risks for perioperative complications:   - No serious cardiac risks = 0 points     RCRI Interpretation: 0 points: Class I (very low risk - 0.4% complication rate)           Signed Electronically by: Sima Parrish MD  Copy of this evaluation report is provided to requesting physician.

## 2022-11-30 NOTE — PATIENT INSTRUCTIONS
Preparing for Your Surgery  Getting started  A nurse will call you to review your health history and instructions. They will give you an arrival time based on your scheduled surgery time. Please be ready to share:    Your doctor s clinic name and phone number    Your medical, surgical, and anesthesia history    A list of allergies and sensitivities    A list of medicines, including herbal treatments and over-the-counter drugs    Whether the patient has a legal guardian (ask how to send us the papers in advance)  Please tell us if you re pregnant--or if there s any chance you might be pregnant. Some surgeries may injure a fetus (unborn baby), so they require a pregnancy test. Surgeries that are safe for a fetus don t always need a test, and you can choose whether to have one.   If you have a child who s having surgery, please ask for a copy of Preparing for Your Child s Surgery.    Preparing for surgery    Within 10 to 30 days of surgery: Have a pre-op exam (sometimes called an H&P, or History and Physical). This can be done at a clinic or pre-operative center.  ? If you re having a , you may not need this exam. Talk to your care team.    At your pre-op exam, talk to your care team about all medicines you take. If you need to stop any medicines before surgery, ask when to start taking them again.  ? We do this for your safety. Many medicines can make you bleed too much during surgery. Some change how well surgery (anesthesia) drugs work.    Call your insurance company to let them know you re having surgery. (If you don t have insurance, call [phone].)    Call your clinic if there s any change in your health. This includes signs of a cold or flu (sore throat, runny nose, cough, rash, fever). It also includes a scrape or scratch near the surgery site.    If you have questions on the day of surgery, call your hospital or surgery center.  COVID testing  You may need to be tested for COVID-19 before having  surgery. If so, we will give you instructions (or click here).  Eating and drinking guidelines  For your safety: Unless your surgeon tells you otherwise, follow the guidelines below.    Eat and drink as usual until 8 hours before you arrive for surgery. After that, no food or milk.    Drink clear liquids until 2 hours before you arrive. These are liquids you can see through, like water, Gatorade, and Propel Water. They also include plain black coffee and tea (no cream or milk), candy, and breath mints. You can spit out gum when you arrive.    If you drink alcohol: Stop drinking it the night before surgery.    If your care team tells you to take medicine on the morning of surgery, it s okay to take it with a sip of water.  Preventing infection    Shower or bathe the night before and morning of your surgery. Follow the instructions your clinic gave you. (If no instructions, use regular soap.)    Don t shave or clip hair near your surgery site. We ll remove the hair if needed.    Don t smoke or vape the morning of surgery. You may chew nicotine gum up to 2 hours before surgery. A nicotine patch is okay.  ? Note: Some surgeries require you to completely quit smoking and nicotine. Check with your surgeon.    Your care team will make every effort to keep you safe from infection. We will:  ? Clean our hands often with soap and water (or an alcohol-based hand rub).  ? Clean the skin at your surgery site with a special soap that kills germs.  ? Give you a special gown to keep you warm. (Cold raises the risk of infection.)  ? Wear special hair covers, masks, gowns and gloves during surgery.  ? Give antibiotic medicine, if prescribed. Not all surgeries need antibiotics.  What to bring on the day of surgery    Photo ID and insurance card    Copy of your health care directive, if you have one    Glasses and hearing aids (bring cases)  ? You can t wear contacts during surgery    Inhaler and eye drops, if you use them (tell us  about these when you arrive)    CPAP machine or breathing device, if you use them    A few personal items, if spending the night    If you have . . .  ? A pacemaker, ICD (cardiac defibrillator) or other implant: Bring the ID card.  ? An implanted stimulator: Bring the remote control.  ? A legal guardian: Bring a copy of the certified (court-stamped) guardianship papers.  Please remove any jewelry, including body piercings. Leave jewelry and other valuables at home.  If you re going home the day of surgery    You must have a responsible adult drive you home. They should stay with you overnight as well.    If you don t have someone to stay with you, and you aren t safe to go home alone, we may keep you overnight. Insurance often won t pay for this.  After surgery  If it s hard to control your pain or you need more pain medicine, please call your surgeon s office.  Questions?   If you have any questions for your care team, list them here:   ____________________________________________________________________________________________________________________________________________________________________________________________________________________________________________________________________  For informational purposes only. Not to replace the advice of your health care provider. Copyright   2003, 2019 [employer]. All rights reserved. Clinically reviewed by Irina Alanis MD. [employer] 371025 - REV 10/22.    How to Take Your Medication Before Surgery  - you can take tylenol in the morning if needed

## 2022-12-15 ENCOUNTER — TRANSFERRED RECORDS (OUTPATIENT)
Dept: HEALTH INFORMATION MANAGEMENT | Facility: CLINIC | Age: 62
End: 2022-12-15

## 2023-01-12 ENCOUNTER — TRANSFERRED RECORDS (OUTPATIENT)
Dept: HEALTH INFORMATION MANAGEMENT | Facility: CLINIC | Age: 63
End: 2023-01-12

## 2023-05-26 ENCOUNTER — TRANSFERRED RECORDS (OUTPATIENT)
Dept: HEALTH INFORMATION MANAGEMENT | Facility: CLINIC | Age: 63
End: 2023-05-26
Payer: COMMERCIAL

## 2023-08-05 NOTE — ADDENDUM NOTE
Addendum Note by Luis Jerry CMA at 1/6/2020  8:00 AM     Author: Luis Jerry CMA Service: -- Author Type: Certified Medical Assistant    Filed: 1/6/2020  8:37 AM Encounter Date: 1/6/2020 Status: Signed    : Luis Jerry CMA (Certified Medical Assistant)    Addended by: LUIS JERRY on: 1/6/2020 08:37 AM        Modules accepted: Orders         Patient refusing to take AM medications: Renvela, Amiodarone, Calcitrol, and Lisinopril. Multiple attempts were made by 2 different staff. Attempts to redirect were unsuccessful. Patient stating she \"does not take all these medications at this time of day\". Will continue to monitor.

## 2023-10-02 ENCOUNTER — MYC MEDICAL ADVICE (OUTPATIENT)
Dept: FAMILY MEDICINE | Facility: CLINIC | Age: 63
End: 2023-10-02
Payer: COMMERCIAL

## 2023-10-02 ASSESSMENT — ENCOUNTER SYMPTOMS
COUGH: 0
ABDOMINAL PAIN: 0
NERVOUS/ANXIOUS: 0
NAUSEA: 0
CONSTIPATION: 0
PARESTHESIAS: 0
DIZZINESS: 1
WEAKNESS: 0
HEMATOCHEZIA: 0
HEARTBURN: 0
HEMATURIA: 0
SHORTNESS OF BREATH: 0
FEVER: 0
FREQUENCY: 0
ARTHRALGIAS: 0
MYALGIAS: 0
DIARRHEA: 0
CHILLS: 0
SORE THROAT: 0
JOINT SWELLING: 0
HEADACHES: 0
DYSURIA: 0
PALPITATIONS: 0
EYE PAIN: 0

## 2023-10-02 NOTE — TELEPHONE ENCOUNTER
Spoke with patient over the phone. Due to the fact that his appointment is at 2pm, I advised that patient not fast that day. He will discuss with the provider if he should come back for a lab only visit vs ordering labs knowing he's not fasting the day of appointment. Pt understands.

## 2023-10-05 ENCOUNTER — OFFICE VISIT (OUTPATIENT)
Dept: FAMILY MEDICINE | Facility: CLINIC | Age: 63
End: 2023-10-05
Payer: COMMERCIAL

## 2023-10-05 VITALS
SYSTOLIC BLOOD PRESSURE: 130 MMHG | DIASTOLIC BLOOD PRESSURE: 80 MMHG | TEMPERATURE: 97.5 F | RESPIRATION RATE: 16 BRPM | BODY MASS INDEX: 28.53 KG/M2 | HEART RATE: 72 BPM | WEIGHT: 203.8 LBS | HEIGHT: 71 IN

## 2023-10-05 DIAGNOSIS — Z12.5 SCREENING FOR PROSTATE CANCER: ICD-10-CM

## 2023-10-05 DIAGNOSIS — R17 ELEVATED BILIRUBIN: ICD-10-CM

## 2023-10-05 DIAGNOSIS — Z00.00 ROUTINE GENERAL MEDICAL EXAMINATION AT A HEALTH CARE FACILITY: Primary | ICD-10-CM

## 2023-10-05 DIAGNOSIS — Z23 NEED FOR VACCINATION: ICD-10-CM

## 2023-10-05 DIAGNOSIS — R42 DIZZINESS: ICD-10-CM

## 2023-10-05 DIAGNOSIS — Z13.220 LIPID SCREENING: ICD-10-CM

## 2023-10-05 LAB
ALBUMIN SERPL BCG-MCNC: 4.7 G/DL (ref 3.5–5.2)
ALP SERPL-CCNC: 51 U/L (ref 40–129)
ALT SERPL W P-5'-P-CCNC: 22 U/L (ref 0–70)
ANION GAP SERPL CALCULATED.3IONS-SCNC: 12 MMOL/L (ref 7–15)
AST SERPL W P-5'-P-CCNC: 21 U/L (ref 0–45)
BILIRUB SERPL-MCNC: 2 MG/DL
BUN SERPL-MCNC: 12.2 MG/DL (ref 8–23)
CALCIUM SERPL-MCNC: 9.7 MG/DL (ref 8.8–10.2)
CHLORIDE SERPL-SCNC: 102 MMOL/L (ref 98–107)
CHOLEST SERPL-MCNC: 174 MG/DL
CREAT SERPL-MCNC: 1.13 MG/DL (ref 0.67–1.17)
DEPRECATED HCO3 PLAS-SCNC: 27 MMOL/L (ref 22–29)
EGFRCR SERPLBLD CKD-EPI 2021: 74 ML/MIN/1.73M2
ERYTHROCYTE [DISTWIDTH] IN BLOOD BY AUTOMATED COUNT: 12.3 % (ref 10–15)
GLUCOSE SERPL-MCNC: 84 MG/DL (ref 70–99)
HCT VFR BLD AUTO: 43.7 % (ref 40–53)
HDLC SERPL-MCNC: 55 MG/DL
HGB BLD-MCNC: 14.9 G/DL (ref 13.3–17.7)
LDLC SERPL CALC-MCNC: 83 MG/DL
MCH RBC QN AUTO: 31.5 PG (ref 26.5–33)
MCHC RBC AUTO-ENTMCNC: 34.1 G/DL (ref 31.5–36.5)
MCV RBC AUTO: 92 FL (ref 78–100)
NONHDLC SERPL-MCNC: 119 MG/DL
PLATELET # BLD AUTO: 221 10E3/UL (ref 150–450)
POTASSIUM SERPL-SCNC: 3.9 MMOL/L (ref 3.4–5.3)
PROT SERPL-MCNC: 7.5 G/DL (ref 6.4–8.3)
PSA SERPL DL<=0.01 NG/ML-MCNC: 1.93 NG/ML (ref 0–4.5)
RBC # BLD AUTO: 4.73 10E6/UL (ref 4.4–5.9)
SODIUM SERPL-SCNC: 141 MMOL/L (ref 135–145)
TRIGL SERPL-MCNC: 181 MG/DL
WBC # BLD AUTO: 6.1 10E3/UL (ref 4–11)

## 2023-10-05 PROCEDURE — 93005 ELECTROCARDIOGRAM TRACING: CPT | Performed by: NURSE PRACTITIONER

## 2023-10-05 PROCEDURE — 90682 RIV4 VACC RECOMBINANT DNA IM: CPT | Performed by: NURSE PRACTITIONER

## 2023-10-05 PROCEDURE — 99213 OFFICE O/P EST LOW 20 MIN: CPT | Mod: 25 | Performed by: NURSE PRACTITIONER

## 2023-10-05 PROCEDURE — 90471 IMMUNIZATION ADMIN: CPT | Performed by: NURSE PRACTITIONER

## 2023-10-05 PROCEDURE — 80061 LIPID PANEL: CPT | Performed by: NURSE PRACTITIONER

## 2023-10-05 PROCEDURE — 85027 COMPLETE CBC AUTOMATED: CPT | Performed by: NURSE PRACTITIONER

## 2023-10-05 PROCEDURE — G0103 PSA SCREENING: HCPCS | Performed by: NURSE PRACTITIONER

## 2023-10-05 PROCEDURE — 80053 COMPREHEN METABOLIC PANEL: CPT | Performed by: NURSE PRACTITIONER

## 2023-10-05 PROCEDURE — 36415 COLL VENOUS BLD VENIPUNCTURE: CPT | Performed by: NURSE PRACTITIONER

## 2023-10-05 PROCEDURE — 99396 PREV VISIT EST AGE 40-64: CPT | Mod: 25 | Performed by: NURSE PRACTITIONER

## 2023-10-05 RX ORDER — ASCORBIC ACID
500 CRYSTALS ORAL
COMMUNITY

## 2023-10-05 RX ORDER — MULTIPLE VITAMINS W/ MINERALS TAB 9MG-400MCG
1 TAB ORAL DAILY
COMMUNITY

## 2023-10-05 RX ORDER — OMEGA-3/DHA/EPA/FISH OIL 60 MG-90MG
CAPSULE ORAL
COMMUNITY

## 2023-10-05 ASSESSMENT — ENCOUNTER SYMPTOMS
JOINT SWELLING: 0
DIZZINESS: 1
HEMATURIA: 0
FREQUENCY: 0
ABDOMINAL PAIN: 0
NAUSEA: 0
NERVOUS/ANXIOUS: 0
SHORTNESS OF BREATH: 0
CONSTIPATION: 0
HEMATOCHEZIA: 0
CHILLS: 0
PALPITATIONS: 0
DIARRHEA: 0
SORE THROAT: 0
EYE PAIN: 0
WEAKNESS: 0
COUGH: 0
DYSURIA: 0
ARTHRALGIAS: 0
MYALGIAS: 0
HEARTBURN: 0
HEADACHES: 0
PARESTHESIAS: 0
FEVER: 0

## 2023-10-05 ASSESSMENT — PAIN SCALES - GENERAL: PAINLEVEL: NO PAIN (0)

## 2023-10-05 NOTE — PATIENT INSTRUCTIONS
Flu shot given today.    Updating some lab work for routine screening and to further assess the dizzy spells.    There is a good chance this could be related to orthostatic hypotension.  This occurs when there is a sudden position change and you have insufficient blood flow to the brain for a few seconds while your body tries to adapt.  This is generally harmless, but if severe can cause temporary fainting episodes.      There is no specific treatment for this other than staying well-hydrated and changing positions slowly.    We could also potentially do a continuous heart monitor to confirm that there is no significant arrhythmia occurring when the spells happen.  We will figure out what to do based on results.    Preventive Health Recommendations  Male Ages 50 - 64    Yearly exam:             See your health care provider every year in order to  o   Review health changes.   o   Discuss preventive care.    o   Review your medicines if your doctor has prescribed any.   Have a cholesterol test every 5 years, or more frequently if you are at risk for high cholesterol/heart disease.   Have a diabetes test (fasting glucose) every three years. If you are at risk for diabetes, you should have this test more often.   Have a colonoscopy at age 45, or have a yearly FIT test (stool test). These exams will check for colon cancer.    Talk with your health care provider about whether or not a prostate cancer screening test (PSA) is right for you.  You should be tested each year for STDs (sexually transmitted diseases), if you re at risk.     Shots: Get a flu shot each year. Get a tetanus shot every 10 years.     Nutrition:  Eat at least 5 servings of fruits and vegetables daily.   Eat whole-grain bread, whole-wheat pasta and brown rice instead of white grains and rice.   Get adequate Calcium and Vitamin D.     Lifestyle  Exercise for at least 150 minutes a week (30 minutes a day, 5 days a week). This will help you control your  weight and prevent disease.   Limit alcohol to one drink per day.   No smoking.   Wear sunscreen to prevent skin cancer.   See your dentist every six months for an exam and cleaning.   See your eye doctor every 1 to 2 years.

## 2023-10-05 NOTE — PROGRESS NOTES
SUBJECTIVE:   CC: Antione is an 62 year old who presents for preventative health visit.       10/5/2023     1:33 PM   Additional Questions   Roomed by Lenora Cade SUJIT     Stays active with walking.      Dizzy spells.  Few seconds at a time. Happens a couple times a day.  No ill symptoms. No chest pain, palpitations, or flutters. No changes to diet.   No swelling in the ankles or feet.   Vision gets fuzzy when it happens.  Feels like he does a good job at staying hydrated.  No bleeding episodes. No skin changes.  Triggered with bending over and getting back up. No headaches.      Healthy Habits:     Getting at least 3 servings of Calcium per day:  Yes    Bi-annual eye exam:  Yes    Dental care twice a year:  Yes    Sleep apnea or symptoms of sleep apnea:  None    Diet:  Regular (no restrictions)    Frequency of exercise:  2-3 days/week    Duration of exercise:  30-45 minutes    Taking medications regularly:  Yes    Medication side effects:  Not applicable    Additional concerns today:  No    Today's PHQ-2 Score:       10/5/2023     1:31 PM   PHQ-2 ( 1999 [employer])   Q1: Little interest or pleasure in doing things 0   Q2: Feeling down, depressed or hopeless 0   PHQ-2 Score 0   Q1: Little interest or pleasure in doing things Not at all   Q2: Feeling down, depressed or hopeless Not at all   PHQ-2 Score 0       Social History     Tobacco Use    Smoking status: Never    Smokeless tobacco: Never   Substance Use Topics    Alcohol use: Yes     Alcohol/week: 7.0 standard drinks of alcohol     Comment: very moderate         10/2/2023     9:42 AM   Alcohol Use   Prescreen: >3 drinks/day or >7 drinks/week? No       Last PSA:   Prostate Specific Antigen Screen   Date Value Ref Range Status   10/05/2023 1.93 0.00 - 4.50 ng/mL Final   01/06/2020 2.8 0.0 - 3.5 ng/mL Final       Reviewed orders with patient. Reviewed health maintenance and updated orders accordingly - Yes  Lab work is in process    Reviewed and updated as needed this  "visit by clinical staff   Tobacco  Allergies  Meds              Reviewed and updated as needed this visit by Provider                 No past medical history on file.   Past Surgical History:   Procedure Laterality Date    APPENDECTOMY      AS KNEE SCOPE,MED/LAT MENISCUS REPAIR Right     ORIF WRIST FRACTURE Left     ORTHOPEDIC SURGERY      VASECTOMY         Review of Systems   Constitutional:  Negative for chills and fever.   HENT:  Negative for congestion, ear pain, hearing loss and sore throat.    Eyes:  Negative for pain and visual disturbance.   Respiratory:  Negative for cough and shortness of breath.    Cardiovascular:  Negative for chest pain, palpitations and peripheral edema.   Gastrointestinal:  Negative for abdominal pain, constipation, diarrhea, heartburn, hematochezia and nausea.   Genitourinary:  Negative for dysuria, frequency, genital sores, hematuria, impotence, penile discharge and urgency.   Musculoskeletal:  Negative for arthralgias, joint swelling and myalgias.   Skin:  Negative for rash.   Neurological:  Positive for dizziness. Negative for weakness, headaches and paresthesias.   Psychiatric/Behavioral:  Negative for mood changes. The patient is not nervous/anxious.        OBJECTIVE:   /80 (BP Location: Left arm, Patient Position: Sitting, Cuff Size: Adult Regular)   Pulse 72   Temp 97.5  F (36.4  C) (Oral)   Resp 16   Ht 1.81 m (5' 11.25\")   Wt 92.4 kg (203 lb 12.8 oz)   BMI 28.23 kg/m      Physical Exam  GENERAL: healthy, alert and no distress  EYES: Eyes grossly normal to inspection, PERRL and conjunctivae and sclerae normal  HENT: ear canals and TM's normal, nose and mouth without ulcers or lesions  NECK: no adenopathy, no asymmetry, masses, or scars and thyroid normal to palpation  RESP: lungs clear to auscultation - no rales, rhonchi or wheezes  CV: regular rate and rhythm, normal S1 S2, no S3 or S4, no murmur, click or rub, no peripheral edema and peripheral pulses " "strong  ABDOMEN: soft, nontender, no hepatosplenomegaly, no masses and bowel sounds normal  MS: no gross musculoskeletal defects noted, no edema  SKIN: no suspicious lesions or rashes  NEURO: Normal strength and tone, mentation intact and speech normal  PSYCH: mentation appears normal, affect normal/bright    Diagnostic Test Results:  Labs reviewed in Epic    ASSESSMENT/PLAN:       ICD-10-CM    1. Routine general medical examination at a health care facility  Z00.00       2. Dizziness  R42 Comprehensive metabolic panel (BMP + Alb, Alk Phos, ALT, AST, Total. Bili, TP)     CBC with platelets     EKG 12-lead, tracing only     Comprehensive metabolic panel (BMP + Alb, Alk Phos, ALT, AST, Total. Bili, TP)     CBC with platelets      3. Screening for prostate cancer  Z12.5 PSA, screen     PSA, screen      4. Lipid screening  Z13.220 Lipid panel     Lipid panel      5. Elevated bilirubin  R17 Comprehensive metabolic panel (BMP + Alb, Alk Phos, ALT, AST, Total. Bili, TP)     Comprehensive metabolic panel (BMP + Alb, Alk Phos, ALT, AST, Total. Bili, TP)      6. Need for vaccination  Z23 INFLUENZA VACCINE 18-64Y (FLUBLOK)        Flu shot given.  Lab work to further assess dizzy spells.  Update ECG.  Lightheadedness sounds like orthostatic hypotension given that it goes from bending over to standing quickly.  Reviewed slow movement changes and staying well-hydrated.  If persistent or worsening, can also get cardiac monitor.  Low suspicion for brain/carotid issue.      COUNSELING:   Reviewed preventive health counseling, as reflected in patient instructions      BMI:   Estimated body mass index is 28.23 kg/m  as calculated from the following:    Height as of this encounter: 1.81 m (5' 11.25\").    Weight as of this encounter: 92.4 kg (203 lb 12.8 oz).   Weight management plan: Discussed healthy diet and exercise guidelines      He reports that he has never smoked. He has never used smokeless tobacco.            Rakesh Rice, " NP  [facility]

## 2023-10-16 ENCOUNTER — ALLIED HEALTH/NURSE VISIT (OUTPATIENT)
Dept: FAMILY MEDICINE | Facility: CLINIC | Age: 63
End: 2023-10-16
Payer: COMMERCIAL

## 2023-10-16 DIAGNOSIS — Z23 ENCOUNTER FOR IMMUNIZATION: Primary | ICD-10-CM

## 2023-10-16 PROCEDURE — 91320 SARSCV2 VAC 30MCG TRS-SUC IM: CPT

## 2023-10-16 PROCEDURE — 90480 ADMN SARSCOV2 VAC 1/ONLY CMP: CPT

## 2023-10-16 PROCEDURE — 99207 PR NO CHARGE NURSE ONLY: CPT

## 2023-10-16 NOTE — PROGRESS NOTES
Prior to immunization administration, verified patients identity using patient s name and date of birth. Please see Immunization Activity for additional information.     Screening Questionnaire for Adult Immunization    Are you sick today?   No   Do you have allergies to medications, food, a vaccine component or latex?   No   Have you ever had a serious reaction after receiving a vaccination?   No   Do you have a long-term health problem with heart, lung, kidney, or metabolic disease (e.g., diabetes), asthma, a blood disorder, no spleen, complement component deficiency, a cochlear implant, or a spinal fluid leak?  Are you on long-term aspirin therapy?   No   Do you have cancer, leukemia, HIV/AIDS, or any other immune system problem?   No   Do you have a parent, brother, or sister with an immune system problem?   No   In the past 3 months, have you taken medications that affect  your immune system, such as prednisone, other steroids, or anticancer drugs; drugs for the treatment of rheumatoid arthritis, Crohn s disease, or psoriasis; or have you had radiation treatments?   No   Have you had a seizure, or a brain or other nervous system problem?   No   During the past year, have you received a transfusion of blood or blood    products, or been given immune (gamma) globulin or antiviral drug?   No   For women: Are you pregnant or is there a chance you could become       pregnant during the next month?   No   Have you received any vaccinations in the past 4 weeks?   No     Immunization questionnaire answers were all negative.    I have reviewed the following standing orders:   This patient is due and qualifies for the Covid-19 vaccine.     Click here for COVID-19 Standing Order    I have reviewed the vaccines inclusion and exclusion criteria; No concerns regarding eligibility.     Patient instructed to remain in clinic for 15 minutes afterwards, and to report any adverse reactions.     Screening performed by Jesica SUAREZ  SUJIT Herrera on 10/16/2023 at 12:57 PM.

## 2024-01-04 ENCOUNTER — NURSE TRIAGE (OUTPATIENT)
Dept: NURSING | Facility: CLINIC | Age: 64
End: 2024-01-04

## 2024-01-04 ENCOUNTER — HOSPITAL ENCOUNTER (OUTPATIENT)
Dept: CT IMAGING | Facility: HOSPITAL | Age: 64
Discharge: HOME | End: 2024-01-04
Attending: FAMILY MEDICINE | Admitting: FAMILY MEDICINE
Payer: COMMERCIAL

## 2024-01-04 ENCOUNTER — OFFICE VISIT (OUTPATIENT)
Dept: PEDIATRICS | Facility: CLINIC | Age: 64
End: 2024-01-04
Payer: COMMERCIAL

## 2024-01-04 VITALS
OXYGEN SATURATION: 98 % | RESPIRATION RATE: 16 BRPM | SYSTOLIC BLOOD PRESSURE: 120 MMHG | TEMPERATURE: 98.2 F | DIASTOLIC BLOOD PRESSURE: 75 MMHG | HEART RATE: 46 BPM

## 2024-01-04 DIAGNOSIS — R31.9 HEMATURIA, UNSPECIFIED TYPE: ICD-10-CM

## 2024-01-04 DIAGNOSIS — R10.84 ABDOMINAL PAIN, GENERALIZED: ICD-10-CM

## 2024-01-04 DIAGNOSIS — N20.0 KIDNEY STONE: ICD-10-CM

## 2024-01-04 DIAGNOSIS — K57.32 DIVERTICULITIS OF COLON: Primary | ICD-10-CM

## 2024-01-04 LAB
ALBUMIN SERPL-MCNC: 4 G/DL (ref 3.4–5)
ALBUMIN UR-MCNC: 30 MG/DL
ALP SERPL-CCNC: 51 U/L (ref 40–150)
ALT SERPL W P-5'-P-CCNC: 23 U/L (ref 0–70)
ANION GAP SERPL CALCULATED.3IONS-SCNC: 7 MMOL/L (ref 3–14)
APPEARANCE UR: CLEAR
AST SERPL W P-5'-P-CCNC: 23 U/L (ref 0–45)
BACTERIA #/AREA URNS HPF: ABNORMAL /HPF
BASOPHILS # BLD AUTO: 0 10E3/UL (ref 0–0.2)
BASOPHILS NFR BLD AUTO: 0 %
BILIRUB SERPL-MCNC: 2.5 MG/DL (ref 0.2–1.3)
BILIRUB UR QL STRIP: ABNORMAL
BUN SERPL-MCNC: 13 MG/DL (ref 7–30)
CALCIUM SERPL-MCNC: 9.1 MG/DL (ref 8.5–10.1)
CHLORIDE BLD-SCNC: 103 MMOL/L (ref 94–109)
CO2 SERPL-SCNC: 27 MMOL/L (ref 20–32)
COLOR UR AUTO: YELLOW
CREAT SERPL-MCNC: 1.3 MG/DL (ref 0.66–1.25)
EGFRCR SERPLBLD CKD-EPI 2021: 63 ML/MIN/1.73M2
EOSINOPHIL # BLD AUTO: 0 10E3/UL (ref 0–0.7)
EOSINOPHIL NFR BLD AUTO: 0 %
ERYTHROCYTE [DISTWIDTH] IN BLOOD BY AUTOMATED COUNT: 12.5 % (ref 10–15)
GLUCOSE BLD-MCNC: 130 MG/DL (ref 70–99)
GLUCOSE UR STRIP-MCNC: NEGATIVE MG/DL
HCT VFR BLD AUTO: 40 % (ref 40–53)
HGB BLD-MCNC: 14.3 G/DL (ref 13.3–17.7)
HGB UR QL STRIP: ABNORMAL
IMM GRANULOCYTES # BLD: 0 10E3/UL
IMM GRANULOCYTES NFR BLD: 0 %
KETONES UR STRIP-MCNC: 15 MG/DL
LEUKOCYTE ESTERASE UR QL STRIP: NEGATIVE
LYMPHOCYTES # BLD AUTO: 0.6 10E3/UL (ref 0.8–5.3)
LYMPHOCYTES NFR BLD AUTO: 6 %
MCH RBC QN AUTO: 31.7 PG (ref 26.5–33)
MCHC RBC AUTO-ENTMCNC: 35.8 G/DL (ref 31.5–36.5)
MCV RBC AUTO: 89 FL (ref 78–100)
MONOCYTES # BLD AUTO: 0.3 10E3/UL (ref 0–1.3)
MONOCYTES NFR BLD AUTO: 3 %
MUCOUS THREADS #/AREA URNS LPF: PRESENT /LPF
NEUTROPHILS # BLD AUTO: 8.6 10E3/UL (ref 1.6–8.3)
NEUTROPHILS NFR BLD AUTO: 91 %
NITRATE UR QL: NEGATIVE
PH UR STRIP: 5.5 [PH] (ref 5–8)
PLATELET # BLD AUTO: 207 10E3/UL (ref 150–450)
POTASSIUM BLD-SCNC: 3.8 MMOL/L (ref 3.4–5.3)
PROT SERPL-MCNC: 7.1 G/DL (ref 6.8–8.8)
RBC # BLD AUTO: 4.51 10E6/UL (ref 4.4–5.9)
RBC #/AREA URNS AUTO: ABNORMAL /HPF
SODIUM SERPL-SCNC: 137 MMOL/L (ref 135–145)
SP GR UR STRIP: 1.02 (ref 1–1.03)
SQUAMOUS #/AREA URNS AUTO: ABNORMAL /LPF
UROBILINOGEN UR STRIP-ACNC: 1 E.U./DL
WBC # BLD AUTO: 9.5 10E3/UL (ref 4–11)
WBC #/AREA URNS AUTO: ABNORMAL /HPF

## 2024-01-04 PROCEDURE — 99214 OFFICE O/P EST MOD 30 MIN: CPT | Performed by: FAMILY MEDICINE

## 2024-01-04 PROCEDURE — 80053 COMPREHEN METABOLIC PANEL: CPT | Performed by: FAMILY MEDICINE

## 2024-01-04 PROCEDURE — 36415 COLL VENOUS BLD VENIPUNCTURE: CPT | Performed by: FAMILY MEDICINE

## 2024-01-04 PROCEDURE — 81001 URINALYSIS AUTO W/SCOPE: CPT | Performed by: FAMILY MEDICINE

## 2024-01-04 PROCEDURE — 85025 COMPLETE CBC W/AUTO DIFF WBC: CPT | Performed by: FAMILY MEDICINE

## 2024-01-04 PROCEDURE — 74178 CT ABD&PLV WO CNTR FLWD CNTR: CPT

## 2024-01-04 PROCEDURE — 250N000011 HC RX IP 250 OP 636: Performed by: FAMILY MEDICINE

## 2024-01-04 RX ORDER — TAMSULOSIN HYDROCHLORIDE 0.4 MG/1
0.4 CAPSULE ORAL DAILY
Qty: 7 CAPSULE | Refills: 0 | Status: SHIPPED | OUTPATIENT
Start: 2024-01-04 | End: 2024-01-11

## 2024-01-04 RX ORDER — IOPAMIDOL 755 MG/ML
90 INJECTION, SOLUTION INTRAVASCULAR ONCE
Status: COMPLETED | OUTPATIENT
Start: 2024-01-04 | End: 2024-01-04

## 2024-01-04 RX ADMIN — IOPAMIDOL 90 ML: 755 INJECTION, SOLUTION INTRAVENOUS at 17:29

## 2024-01-04 ASSESSMENT — PAIN SCALES - GENERAL: PAINLEVEL: MILD PAIN (2)

## 2024-01-04 NOTE — PROGRESS NOTES
Assessment & Plan     Diverticulitis of colon  Patient elected to try treating without antibiotics.  I did send in Augmentin for him and asked them to keep it on hold and fill it if he calls.  Printed patient education regarding diverticulitis  - amoxicillin-clavulanate (AUGMENTIN) 875-125 MG tablet; Take 1 tablet by mouth 2 times daily for 7 days    Kidney stone  2 mm stone that is now in the bladder.  Patient declines Flomax but again I sent it in with instructions to just fill it if the patient calls  He is can to drink plenty of fluids  Use ibuprofen products as needed  He did not want a urine strainer  - tamsulosin (FLOMAX) 0.4 MG capsule; Take 1 capsule (0.4 mg) by mouth daily for 7 days    Abdominal pain, generalized  CT shows both diverticulitis and kidney stone in the bladder  - CBC with platelets differential; Future  - Comprehensive metabolic panel; Future  - UA with Microscopic reflex to Culture; Future  - UA with Microscopic reflex to Culture  - Comprehensive metabolic panel  - CBC with platelets differential  - UA Microscopic with Reflex to Culture  - CT Abdomen Pelvis w/o & w Contrast; Future    Hematuria, unspecified type  Due to a 2 mm stone  - CT Abdomen Pelvis w/o & w Contrast; Future                 No follow-ups on file.    Delma Mo MD  [facility] [place]    Dean Talbot is a 63 year old, presenting for the following health issues:  Abdominal Pain      HPI     Abdominal/Flank Pain  Onset/Duration: Today at 8:45 in the morning  Description:   Character: Dull ache  Location: right flank  Radiation: Mid abdomen  Intensity: It was a 5 out of 10 earlier today and now it is a 2 out of 10  Progression of Symptoms:  waxing and waning  Accompanying Signs & Symptoms:  Fever/chills: YES-he had a fever a week ago  Gas/Bloating: YES-more predominant last week  Nausea: YES-had nausea today when the pain went up to a 5 out of 10  Vomitting: no   Diarrhea: YES-had a diarrhea  "illness last week with no diarrhea the last 2 days  Constipation:no   Dysuria: no            Hematuria: no            Frequency: no            Incontinence of urine: no   History:            Last bowel movement: today  Trauma: no   Previous similar pain: no    Previous tests done: none           Previous Abdominal surgery: YES-appendix out  Precipitating factors:   Does the pain change with:     Food: no      Bowel Movement: no     Urination: no              Other factors: no   Therapies tried and outcome:  None    When food last eaten: This morning at about 7:15 in the morning    Patient is a 63-year-old male who is here with his wife.  He has been having right flank pain that radiated to his mid abdomen today starting at 8:45 AM.  He was at work and the pain escalated to a 5 out of 10 and made him feel dizzy lightheaded and nauseous.  He came home from work.  Right now the pain is a 2 out of 10 and he is not having the nausea or dizziness.  The pain is constant but does not feel sharp.  Yesterday he had some groin pain but that went away.  No history of kidney stones  No blood in the urine.  The urine does seem darker.  No recent travel or hospitalizations.  No recent antibiotics.    Last week he had a fever, myalgias and fatigue and then ended up having diarrhea with some gas and bloating.  He was not eating as much and was getting some improvement and then on Sunday, 4 days ago, he had a meat ball and his diarrhea symptoms got worse again.  On Monday he took an Imodium and he had no diarrhea Tuesday and Wednesday.  He was still feeling tired.  No more fevers.    As far as his irregular heartbeat the patient reports that he has \"skipped beats\" all the time and that is normal for him.  He is having no chest pain or shortness of breath.  No palpitations.  He always has an irregular heartbeat according to him.    Review of Systems   Negative except as listed in HPI      Objective    /75 (BP Location: Right " arm, Patient Position: Sitting, Cuff Size: Adult Regular)   Pulse (!) 46   Temp 98.2  F (36.8  C) (Oral)   Resp 16   SpO2 98%   There is no height or weight on file to calculate BMI.  Physical Exam   Vitals are noted and within normal limits except for a low pulse  In general he is alert, oriented, and in no acute distress  Neck is supple with no cervical adenopathy  Heart is irregular with no murmurs  Lungs are clear to auscultation bilaterally with good air entry  There is mild CVA tenderness on the right side  Abdomen with normal bowel sounds, soft and nontender to palpation currently  UA shows large amount of blood  CBC with low total white blood count of 9.5 and elevated absolute neutrophils  CMP shows his chronic elevated bilirubin and also some mild renal insufficiency.  Glucose is also elevated but patient is not fasting.  CT abdomen pelvis: 2 mm stone in the bladder and also acute diverticulitis noted  Results for orders placed or performed during the hospital encounter of 01/04/24   CT Abdomen Pelvis w/o & w Contrast     Status: None    Narrative    EXAM: CT ABDOMEN PELVIS W/O and W CONTRAST  LOCATION: [facility]  DATE: 1/4/2024    INDICATION: blood in urine.  flank pain.  elevated neutrophils.  COMPARISON: None.  TECHNIQUE: CT scan of the abdomen and pelvis was performed before and after injection of IV contrast. Multiplanar reformats were obtained. Dose reduction techniques were used.  CONTRAST: 90ml Isovue 370     FINDINGS:   LOWER CHEST: Normal.    HEPATOBILIARY: Focal fatty infiltration along falciform ligament. Tiny cyst within right lobe of liver. Cholelithiasis with small gallstone noted but no inflammatory changes of gallbladder.    PANCREAS: Normal.    SPLEEN: Normal.    ADRENAL GLANDS: Normal.    KIDNEYS/BLADDER: No current kidney stones or hydronephrosis. No ureteral stones. However there is a tiny 2 mm bladder stone lying freely within the bladder. Normal bilateral  renal enhancement and excretion bilaterally.    BOWEL: Localized colonic wall thickening and pericolonic soft tissue stranding proximal sigmoid colon consistent with acute diverticulitis. Numerous sigmoid diverticula are present. No free perforation, abscess or obstruction.    LYMPH NODES: Normal.    VASCULATURE: Unremarkable.    PELVIC ORGANS: Modest prostatic enlargement.    MUSCULOSKELETAL: Normal.      Impression    IMPRESSION:   1.  Acute sigmoid diverticulitis.  2.  2 mm bladder stone. No current kidney stone or hydronephrosis.     Results for orders placed or performed in visit on 01/04/24   UA with Microscopic reflex to Culture     Status: Abnormal    Specimen: Urine, Clean Catch   Result Value Ref Range    Color Urine Yellow Colorless, Straw, Light Yellow, Yellow    Appearance Urine Clear Clear    Glucose Urine Negative Negative mg/dL    Bilirubin Urine Small (A) Negative    Ketones Urine 15 (A) Negative mg/dL    Specific Gravity Urine 1.020 1.005 - 1.030    Blood Urine Large (A) Negative    pH Urine 5.5 5.0 - 8.0    Protein Albumin Urine 30 (A) Negative mg/dL    Urobilinogen Urine 1.0 0.2, 1.0 E.U./dL    Nitrite Urine Negative Negative    Leukocyte Esterase Urine Negative Negative   Comprehensive metabolic panel     Status: Abnormal   Result Value Ref Range    Sodium 137 135 - 145 mmol/L    Potassium 3.8 3.4 - 5.3 mmol/L    Chloride 103 94 - 109 mmol/L    Carbon Dioxide (CO2) 27 20 - 32 mmol/L    Anion Gap 7 3 - 14 mmol/L    Urea Nitrogen 13 7 - 30 mg/dL    Creatinine 1.30 (H) 0.66 - 1.25 mg/dL    GFR Estimate 63 >60 mL/min/1.73m2    Calcium 9.1 8.5 - 10.1 mg/dL    Glucose 130 (H) 70 - 99 mg/dL    Alkaline Phosphatase 51 40 - 150 U/L    AST 23 0 - 45 U/L    ALT 23 0 - 70 U/L    Protein Total 7.1 6.8 - 8.8 g/dL    Albumin 4.0 3.4 - 5.0 g/dL    Bilirubin Total 2.5 (H) 0.2 - 1.3 mg/dL   CBC with platelets and differential     Status: Abnormal   Result Value Ref Range    WBC Count 9.5 4.0 - 11.0 10e3/uL    RBC  Count 4.51 4.40 - 5.90 10e6/uL    Hemoglobin 14.3 13.3 - 17.7 g/dL    Hematocrit 40.0 40.0 - 53.0 %    MCV 89 78 - 100 fL    MCH 31.7 26.5 - 33.0 pg    MCHC 35.8 31.5 - 36.5 g/dL    RDW 12.5 10.0 - 15.0 %    Platelet Count 207 150 - 450 10e3/uL    % Neutrophils 91 %    % Lymphocytes 6 %    % Monocytes 3 %    % Eosinophils 0 %    % Basophils 0 %    % Immature Granulocytes 0 %    Absolute Neutrophils 8.6 (H) 1.6 - 8.3 10e3/uL    Absolute Lymphocytes 0.6 (L) 0.8 - 5.3 10e3/uL    Absolute Monocytes 0.3 0.0 - 1.3 10e3/uL    Absolute Eosinophils 0.0 0.0 - 0.7 10e3/uL    Absolute Basophils 0.0 0.0 - 0.2 10e3/uL    Absolute Immature Granulocytes 0.0 <=0.4 10e3/uL   UA Microscopic with Reflex to Culture     Status: Abnormal   Result Value Ref Range    Bacteria Urine Few (A) None Seen /HPF    RBC Urine 25-50 (A) 0-2 /HPF /HPF    WBC Urine None Seen 0-5 /HPF /HPF    Squamous Epithelials Urine Few (A) None Seen /LPF    Mucus Urine Present (A) None Seen /LPF    Narrative    Urine Culture not indicated   CBC with platelets differential     Status: Abnormal    Narrative    The following orders were created for panel order CBC with platelets differential.  Procedure                               Abnormality         Status                     ---------                               -----------         ------                     CBC with platelets and d...[[id number]]  Abnormal            Final result                 Please view results for these tests on the individual orders.

## 2024-01-04 NOTE — TELEPHONE ENCOUNTER
"Nurse Triage SBAR    Is this a 2nd Level Triage? YES, LICENSED PRACTITIONER REVIEW IS REQUIRED    Please call patient at [phone] (home)   Disposition to see in ED/UC or clinic with PCP approval/or consider ADS. Please advise where you prefer patient to be seen.    Situation:   Spouse Lora calling (consent to communicate on file). Reporting right sided abd pain.    Background:   Appendectomy    Assessment:     Reports symptoms starting 1 week ago feeling \"achy\" fatigue, stomach cramping and diarrhea.  Diarrhea resolved Monday 1/1/24 after taking Imodium.  Stating he passed a small \"normal\" stool this morning.  Abd pain starting around 845 a.m. today, mid right abd.  Pain is constant rating \"5\" on 1-10 pain scale.  Nausea. Afebrile.  Denies urinary symptoms.  Stating he had episode of pain into scrotum yesterday that resolved.   Denies any history of similar pain.         Protocol Recommended Disposition:   See in ED/UC or clinic with PCP approval.    Recommendation:    Routed to provider    Does the patient meet one of the following criteria for ADS visit consideration? 16+ years old, with an [facility] PCP     TIP  Providers, please consider if this condition is appropriate for management at one of our Acute and Diagnostic Services sites.     If patient is a good candidate, please use dotphrase <dot>triageresponse and select Refer to ADS to document.  Reason for Disposition   MILD TO MODERATE constant pain lasting > 2 hours    Additional Information   Negative: Passed out (i.e., fainted, collapsed and was not responding)   Negative: Shock suspected (e.g., cold/pale/clammy skin, too weak to stand, low BP, rapid pulse)   Negative: Sounds like a life-threatening emergency to the triager   Negative: Followed an abdomen (stomach) injury   Negative: Chest pain   Negative: Pain is mainly in upper abdomen (if needed ask: 'is it mainly above the belly button?')   Negative: Abdomen bloating or swelling are main symptoms   " Negative: SEVERE abdominal pain (e.g., excruciating)   Negative: Vomiting red blood or black (coffee ground) material   Negative: Blood in bowel movements  (Exception: Blood on surface of BM with constipation.)   Negative: Black or tarry bowel movements  (Exception: Chronic-unchanged black-grey BMs AND is taking iron pills or Pepto-Bismol.)   Negative: Unable to urinate (or only a few drops) and bladder feels very full   Negative: Pain in scrotum persists > 1 hour    Protocols used: Abdominal Pain - Male-A-[place]

## 2024-01-04 NOTE — TELEPHONE ENCOUNTER
Spoke to patient and also to ADS and patient will be seen there at 3pm today.  [place]    LACEY MorrisonN RN  [facility]

## 2024-01-05 NOTE — PATIENT INSTRUCTIONS
Drink plenty of fluids  Ibuprofen (Advil, Motrin) 200mg tablets.  You may take 3 tabs every 6-8 hours as needed with food.  Can also use acetaminophen as needed.  Fill the flomax if needed.      If the diarrhea comes back or you get left sided abdominal pain then fill the prescription for the augmentin.

## 2024-04-09 ENCOUNTER — MYC MEDICAL ADVICE (OUTPATIENT)
Dept: FAMILY MEDICINE | Facility: CLINIC | Age: 64
End: 2024-04-09
Payer: COMMERCIAL

## 2024-04-09 ENCOUNTER — NURSE TRIAGE (OUTPATIENT)
Dept: FAMILY MEDICINE | Facility: CLINIC | Age: 64
End: 2024-04-09
Payer: COMMERCIAL

## 2024-04-09 NOTE — TELEPHONE ENCOUNTER
"Patient sent MyC message:  \"Yuri Cameron.  I have had a cough hanging around for a month or so now.  It is a dry cough and not productive.  When I most notice it is when I eat meals , especially lunch and dinner/evening meal.  Is this something I should come in for?  My health is good otherwise.  I just scheduled my annual physical follow up with you for October.   Thanks for following up.\"    Patient reports cold symptoms were present when cough first started a month ago. Cold symptoms resolved after one week. Denies any other symptoms at this time. No fever or chest pain.     Patient reports increased coughing while actively eating a meal.     Appointment scheduled with PCP for 4/11/24.    Reason for Disposition   Dry lingering cough and recent cold symptoms (e.g., runny nose, fever)    Additional Information   Negative: SEVERE difficulty breathing (e.g., struggling for each breath, speaks in single words)   Negative: Lips or face are bluish now and persists when not coughing   Negative: Sounds like a life-threatening emergency to the triager   Negative: Chest pain is main symptom   Negative: Cough and < 3 weeks duration   Negative: Previous asthma attacks and this feels like an asthma attack   Negative: MODERATE difficulty breathing (e.g., speaks in phrases, SOB even at rest, pulse 100-120) and still present when not coughing   Negative: Chest pain  (Exception: MILD central chest pain, present only when coughing.)   Negative: Increasing difficulty breathing and always has some difficulty breathing   Negative: Patient sounds very sick or weak to the triager   Negative: MILD difficulty breathing (e.g., minimal/no SOB at rest, SOB with walking, pulse < 100) and still present when not coughing  (Exception: No change from usual, chronic shortness of breath.)   Negative: Coughed up blood and > 1 tablespoon (15 ml)  (Exception: Blood-tinged sputum.)   Negative: Fever > 103 F (39.4 C)   Negative: Fever > 101 F (38.3 C) and " "age > 60 years   Negative: Fever > 100.0 F (37.8 C) and bedridden (e.g., CVA, chronic illness, recovering from surgery)   Negative: Fever > 100.0 F (37.8 C) and diabetes mellitus or weak immune system (e.g., HIV positive, cancer chemo, splenectomy, organ transplant, chronic steroids)   Negative: SEVERE coughing spells (e.g., whooping sound after coughing, vomiting after coughing)   Negative: Continuous (nonstop) coughing interferes with work or school and no improvement using cough treatment per Care Advice   Negative: Fever present > 3 days (72 hours)   Negative: Coughing up prashant-colored sputum   Negative: Change in color of sputum (e.g., from white to yellow-green sputum)   Negative: Increase in amount of sputum   Negative: Taking an ACE Inhibitor medicine (e.g., benazepril/LOTENSIN, captopril/CAPOTEN, enalapril/VASOTEC, lisinopril/ZESTRIL)   Negative: Chest or rib pain and only occurs while coughing   Negative: Sinus pain or pressure (around cheekbone or eye)   Negative: Nasal discharge and present > 10 days   Negative: Blood-tinged sputum has been coughed up and more than once   Negative: History of asthma or has mild wheezing   Negative: Exposure to TB (Tuberculosis)   Negative: Patient wants to be seen   Negative: History of gastric reflux and intermittent symptoms of sour taste in mouth and dry cough    Answer Assessment - Initial Assessment Questions  1. ONSET: \"When did the cough begin?\"       About one month   2. SEVERITY: \"How bad is the cough today?\"       During meals, while eating.   3. SPUTUM: \"Describe the color of your sputum\" (none, dry cough; clear, white, yellow, green)      Dry   4. HEMOPTYSIS: \"Are you coughing up any blood?\" If so ask: \"How much?\" (flecks, streaks, tablespoons, etc.)      Dry  5. DIFFICULTY BREATHING: \"Are you having difficulty breathing?\" If Yes, ask: \"How bad is it?\" (e.g., mild, moderate, severe)     - MILD: No SOB at rest, mild SOB with walking, speaks normally in " "sentences, can lie down, no retractions, pulse < 100.     - MODERATE: SOB at rest, SOB with minimal exertion and prefers to sit, cannot lie down flat, speaks in phrases, mild retractions, audible wheezing, pulse 100-120.     - SEVERE: Very SOB at rest, speaks in single words, struggling to breathe, sitting hunched forward, retractions, pulse > 120       No   6. FEVER: \"Do you have a fever?\" If Yes, ask: \"What is your temperature, how was it measured, and when did it start?\"      No  7. CARDIAC HISTORY: \"Do you have any history of heart disease?\" (e.g., heart attack, congestive heart failure)       No  8. LUNG HISTORY: \"Do you have any history of lung disease?\"  (e.g., pulmonary embolus, asthma, emphysema)      No  9. PE RISK FACTORS: \"Do you have a history of blood clots?\" (or: recent major surgery, recent prolonged travel, bedridden)      No  10. OTHER SYMPTOMS: \"Do you have any other symptoms?\" (e.g., runny nose, wheezing, chest pain)        Not currently   11. PREGNANCY: \"Is there any chance you are pregnant?\" \"When was your last menstrual period?\"        NA  12. TRAVEL: \"Have you traveled out of the country in the last month?\" (e.g., travel history, exposures)        No    Protocols used: Cough - Chronic-A-OH    Triny Hurley RN  [employer]    "

## 2024-04-11 ENCOUNTER — OFFICE VISIT (OUTPATIENT)
Dept: FAMILY MEDICINE | Facility: CLINIC | Age: 64
End: 2024-04-11
Payer: COMMERCIAL

## 2024-04-11 VITALS
TEMPERATURE: 97.7 F | SYSTOLIC BLOOD PRESSURE: 132 MMHG | HEART RATE: 68 BPM | RESPIRATION RATE: 16 BRPM | BODY MASS INDEX: 28.25 KG/M2 | OXYGEN SATURATION: 97 % | WEIGHT: 204 LBS | DIASTOLIC BLOOD PRESSURE: 80 MMHG

## 2024-04-11 DIAGNOSIS — R05.2 SUBACUTE COUGH: Primary | ICD-10-CM

## 2024-04-11 PROCEDURE — 99213 OFFICE O/P EST LOW 20 MIN: CPT | Performed by: NURSE PRACTITIONER

## 2024-04-11 RX ORDER — AZITHROMYCIN 250 MG/1
TABLET, FILM COATED ORAL
Qty: 6 TABLET | Refills: 0 | Status: SHIPPED | OUTPATIENT
Start: 2024-04-11 | End: 2024-04-16

## 2024-04-11 ASSESSMENT — PAIN SCALES - GENERAL: PAINLEVEL: NO PAIN (0)

## 2024-04-11 ASSESSMENT — ENCOUNTER SYMPTOMS: COUGH: 1

## 2024-04-11 NOTE — PATIENT INSTRUCTIONS
For the bronchitis I sent azithromycin/Z-Asad to your pharmacy.  Follow-up with directions on the package.    If this does not help, send me a message and we can try some steroids.    If that does not help, let me know and we can get a chest x-ray and go from there.

## 2024-04-11 NOTE — PROGRESS NOTES
Assessment & Plan     ICD-10-CM    1. Subacute cough  R05.2 azithromycin (ZITHROMAX) 250 MG tablet        Persistent cough for 2 months following respiratory illness.  Start with azithromycin.  If no benefit prednisone.  If still struggling chest x-ray.  Did note irregular heart rate.  Review of records shows this been reviewed multiple times.  ECG has shown PVCs.  Patient feels fine.  Continue to monitor.    Subjective     HPI     Patient presents today with concerns of a cough.  Had a respiratory infection about a month ago but cough persists.  Dry.  No wheezing or shortness of breath.  No chest pain.  No hemoptysis.  No LE swelling. Sinuses feel fine. Will be travelling to [place] in about a month.  Non smoker. No asthma history.      Review of Systems - negative except for what's listed in the HPI      Objective    /80 (BP Location: Right arm, Patient Position: Sitting, Cuff Size: Adult Regular)   Pulse 68   Temp 97.7  F (36.5  C) (Oral)   Resp 16   Wt 92.5 kg (204 lb)   SpO2 97%   BMI 28.25 kg/m    Physical Exam   General appearance - alert, well appearing, and in no distress  Mental status - alert, oriented to person, place, and time  Eyes -PERRLA  Ears - bilateral TM's and external ear canals normal  Nose - normal and patent, no erythema, discharge or polyps  Mouth - mucous membranes moist. No oral lesions.  Neck - no significant adenopathy  Lymphatics - no palpable lymphadenopathy  Chest - clear to auscultation, no wheezes, rales or rhonchi, symmetric air entry  Heart -irregular rhythm, S1 and S2 normal, no murmurs noted  Extremities - peripheral pulses normal, no peripheral edema  Skin - normal coloration and turgor.    Rakesh Rice, CNP    This note has been dictated using voice recognition software. Any grammatical or context distortions are unintentional and inherent to the software.

## 2024-04-19 ENCOUNTER — ANCILLARY PROCEDURE (OUTPATIENT)
Dept: GENERAL RADIOLOGY | Facility: CLINIC | Age: 64
End: 2024-04-19
Attending: NURSE PRACTITIONER
Payer: COMMERCIAL

## 2024-04-19 DIAGNOSIS — R05.2 SUBACUTE COUGH: ICD-10-CM

## 2024-04-19 PROCEDURE — 71046 X-RAY EXAM CHEST 2 VIEWS: CPT | Mod: TC | Performed by: RADIOLOGY

## 2024-10-07 SDOH — HEALTH STABILITY: PHYSICAL HEALTH: ON AVERAGE, HOW MANY DAYS PER WEEK DO YOU ENGAGE IN MODERATE TO STRENUOUS EXERCISE (LIKE A BRISK WALK)?: 4 DAYS

## 2024-10-07 SDOH — HEALTH STABILITY: PHYSICAL HEALTH: ON AVERAGE, HOW MANY MINUTES DO YOU ENGAGE IN EXERCISE AT THIS LEVEL?: 30 MIN

## 2024-10-07 ASSESSMENT — SOCIAL DETERMINANTS OF HEALTH (SDOH): HOW OFTEN DO YOU GET TOGETHER WITH FRIENDS OR RELATIVES?: ONCE A WEEK

## 2024-10-08 ENCOUNTER — OFFICE VISIT (OUTPATIENT)
Dept: FAMILY MEDICINE | Facility: CLINIC | Age: 64
End: 2024-10-08
Attending: NURSE PRACTITIONER
Payer: COMMERCIAL

## 2024-10-08 ENCOUNTER — ANCILLARY PROCEDURE (OUTPATIENT)
Dept: GENERAL RADIOLOGY | Facility: CLINIC | Age: 64
End: 2024-10-08
Attending: NURSE PRACTITIONER
Payer: COMMERCIAL

## 2024-10-08 VITALS
OXYGEN SATURATION: 96 % | HEART RATE: 84 BPM | BODY MASS INDEX: 27.61 KG/M2 | RESPIRATION RATE: 16 BRPM | DIASTOLIC BLOOD PRESSURE: 74 MMHG | SYSTOLIC BLOOD PRESSURE: 130 MMHG | WEIGHT: 197.2 LBS | TEMPERATURE: 97.9 F | HEIGHT: 71 IN

## 2024-10-08 DIAGNOSIS — Z13.220 LIPID SCREENING: ICD-10-CM

## 2024-10-08 DIAGNOSIS — Z00.00 ROUTINE GENERAL MEDICAL EXAMINATION AT A HEALTH CARE FACILITY: Primary | ICD-10-CM

## 2024-10-08 DIAGNOSIS — Z12.5 SCREENING FOR PROSTATE CANCER: ICD-10-CM

## 2024-10-08 DIAGNOSIS — M79.672 LEFT FOOT PAIN: ICD-10-CM

## 2024-10-08 DIAGNOSIS — R05.3 CHRONIC COUGH: ICD-10-CM

## 2024-10-08 DIAGNOSIS — R17 ELEVATED BILIRUBIN: ICD-10-CM

## 2024-10-08 LAB
ALBUMIN SERPL BCG-MCNC: 4.5 G/DL (ref 3.5–5.2)
ALP SERPL-CCNC: 50 U/L (ref 40–150)
ALT SERPL W P-5'-P-CCNC: 25 U/L (ref 0–70)
ANION GAP SERPL CALCULATED.3IONS-SCNC: 11 MMOL/L (ref 7–15)
AST SERPL W P-5'-P-CCNC: 23 U/L (ref 0–45)
BILIRUB SERPL-MCNC: 2 MG/DL
BUN SERPL-MCNC: 16.2 MG/DL (ref 8–23)
CALCIUM SERPL-MCNC: 9.5 MG/DL (ref 8.8–10.4)
CHLORIDE SERPL-SCNC: 103 MMOL/L (ref 98–107)
CHOLEST SERPL-MCNC: 170 MG/DL
CREAT SERPL-MCNC: 1.2 MG/DL (ref 0.67–1.17)
EGFRCR SERPLBLD CKD-EPI 2021: 69 ML/MIN/1.73M2
ERYTHROCYTE [DISTWIDTH] IN BLOOD BY AUTOMATED COUNT: 12.4 % (ref 10–15)
FASTING STATUS PATIENT QL REPORTED: YES
FASTING STATUS PATIENT QL REPORTED: YES
GLUCOSE SERPL-MCNC: 103 MG/DL (ref 70–99)
HCO3 SERPL-SCNC: 26 MMOL/L (ref 22–29)
HCT VFR BLD AUTO: 44.7 % (ref 40–53)
HDLC SERPL-MCNC: 54 MG/DL
HGB BLD-MCNC: 15.1 G/DL (ref 13.3–17.7)
LDLC SERPL CALC-MCNC: 99 MG/DL
MCH RBC QN AUTO: 31 PG (ref 26.5–33)
MCHC RBC AUTO-ENTMCNC: 33.8 G/DL (ref 31.5–36.5)
MCV RBC AUTO: 92 FL (ref 78–100)
NONHDLC SERPL-MCNC: 116 MG/DL
PLATELET # BLD AUTO: 191 10E3/UL (ref 150–450)
POTASSIUM SERPL-SCNC: 4.6 MMOL/L (ref 3.4–5.3)
PROT SERPL-MCNC: 7.4 G/DL (ref 6.4–8.3)
PSA SERPL DL<=0.01 NG/ML-MCNC: 1.81 NG/ML (ref 0–4.5)
RBC # BLD AUTO: 4.87 10E6/UL (ref 4.4–5.9)
SODIUM SERPL-SCNC: 140 MMOL/L (ref 135–145)
TRIGL SERPL-MCNC: 83 MG/DL
WBC # BLD AUTO: 5.5 10E3/UL (ref 4–11)

## 2024-10-08 PROCEDURE — 85027 COMPLETE CBC AUTOMATED: CPT | Performed by: NURSE PRACTITIONER

## 2024-10-08 PROCEDURE — 91320 SARSCV2 VAC 30MCG TRS-SUC IM: CPT | Performed by: NURSE PRACTITIONER

## 2024-10-08 PROCEDURE — 80053 COMPREHEN METABOLIC PANEL: CPT | Performed by: NURSE PRACTITIONER

## 2024-10-08 PROCEDURE — 90480 ADMN SARSCOV2 VAC 1/ONLY CMP: CPT | Performed by: NURSE PRACTITIONER

## 2024-10-08 PROCEDURE — 80061 LIPID PANEL: CPT | Performed by: NURSE PRACTITIONER

## 2024-10-08 PROCEDURE — 73630 X-RAY EXAM OF FOOT: CPT | Mod: TC | Performed by: INTERNAL MEDICINE

## 2024-10-08 PROCEDURE — 36415 COLL VENOUS BLD VENIPUNCTURE: CPT | Performed by: NURSE PRACTITIONER

## 2024-10-08 PROCEDURE — G0103 PSA SCREENING: HCPCS | Performed by: NURSE PRACTITIONER

## 2024-10-08 PROCEDURE — 99396 PREV VISIT EST AGE 40-64: CPT | Performed by: NURSE PRACTITIONER

## 2024-10-08 ASSESSMENT — PAIN SCALES - GENERAL: PAINLEVEL: NO PAIN (0)

## 2024-10-08 NOTE — PROGRESS NOTES
Preventive Care Visit  [facility]  Rakesh Rice, NIKOLAS,    Oct 8, 2024        ICD-10-CM    1. Routine general medical examination at a health care facility  Z00.00       2. Chronic cough  R05.3       3. Left foot pain  M79.672 XR Toe Left G/E 2 Views     CANCELED: XR Toe Left G/E 2 Views      4. Elevated bilirubin  R17 Comprehensive metabolic panel (BMP + Alb, Alk Phos, ALT, AST, Total. Bili, TP)     CBC with platelets     Comprehensive metabolic panel (BMP + Alb, Alk Phos, ALT, AST, Total. Bili, TP)     CBC with platelets      5. Lipid screening  Z13.220 Lipid panel     Lipid panel      6. Screening for prostate cancer  Z12.5 PSA, screen     PSA, screen        Update screening labs.  Reviewed healthy lifestyle behaviors.  Suspect arthritic changes at the base of the toes.  Start with x-ray for further evaluation.  Since cough is triggered with eating, try omeprazole.  If no help, try Flonase to see if PND could be a trigger.  If still having issues, let me know and we can try Qvar.  Reviewed shingles vaccine.  COVID shot given.    Subjective   Antione is a 63 year old, presenting for the following:  Physical (Pt is fasting. Chronic cough. L foot pain x1+ month-worse at night. )        10/8/2024     7:21 AM   Additional Questions   Roomed by Nimo RUIZ LPN        Health Care Directive  Patient does not have a Health Care Directive or Living Will: previously reviewed    HPI    Still dealing with a cough that we addressed back in spring.  Has been present for months now.  Previously tried prednisone and azithromycin. Prednisone provided the most improvement, but it was only temporary. Chest x-ray clear. Eating is a trigger. No hemoptysis.       Patient notes left foot pain.  Worse at month.  Has been present for the past month or so.  Pain is primarily at toes 2 and 3. .  Atraumatic.  No redness or swelling.  Afebrile without chills. Maybe a little numbness at the end of the day.      Next  colonoscopy due May 2028        10/7/2024   General Health   How would you rate your overall physical health? Excellent   Feel stress (tense, anxious, or unable to sleep) Only a little      (!) STRESS CONCERN      10/7/2024   Nutrition   Three or more servings of calcium each day? Yes   Diet: Regular (no restrictions)   How many servings of fruit and vegetables per day? (!) 0-1   How many sweetened beverages each day? (!) 2          10/7/2024   Exercise   Days per week of moderate/strenous exercise 4 days   Average minutes spent exercising at this level 30 min          10/7/2024   Social Factors   Frequency of gathering with friends or relatives Once a week   Worry food won't last until get money to buy more No   Food not last or not have enough money for food? No   Do you have housing? (Housing is defined as stable permanent housing and does not include staying ouside in a car, in a tent, in an abandoned building, in an overnight shelter, or couch-surfing.) Yes   Are you worried about losing your housing? No   Lack of transportation? No   Unable to get utilities (heat,electricity)? No            10/7/2024   Fall Risk   Fallen 2 or more times in the past year? No   Trouble with walking or balance? No             10/7/2024   Dental   Dentist two times every year? Yes            10/7/2024   TB Screening   Were you born outside of the [place]? No            Today's PHQ-2 Score:       10/7/2024     7:53 AM   PHQ-2 ( 1999 [employer])   Q1: Little interest or pleasure in doing things 0   Q2: Feeling down, depressed or hopeless 0   PHQ-2 Score 0   Q1: Little interest or pleasure in doing things Not at all   Q2: Feeling down, depressed or hopeless Not at all   PHQ-2 Score 0         10/7/2024   Substance Use   Alcohol more than 3/day or more than 7/wk No   Do you use any other substances recreationally? No      Social History     Tobacco Use    Smoking status: Never     Passive exposure: Never    Smokeless tobacco: Never   Vaping Use  "   Vaping status: Never Used   Substance Use Topics    Alcohol use: Yes     Alcohol/week: 7.0 standard drinks of alcohol     Comment: very moderate    Drug use: No         10/7/2024   STI Screening   New sexual partner(s) since last STI/HIV test? No      Last PSA:   Prostate Specific Antigen Screen   Date Value Ref Range Status   10/05/2023 1.93 0.00 - 4.50 ng/mL Final   01/06/2020 2.8 0.0 - 3.5 ng/mL Final     ASCVD Risk   The 10-year ASCVD risk score (Timothy HOOPER, et al., 2019) is: 8%    Values used to calculate the score:      Age: 63 years      Sex: Male      Is Non- : No      Diabetic: No      Tobacco smoker: No      Systolic Blood Pressure: 130 mmHg      Is BP treated: No      HDL Cholesterol: 55 mg/dL      Total Cholesterol: 174 mg/dL    Reviewed and updated as needed this visit by Provider                  No past medical history on file.  Past Surgical History:   Procedure Laterality Date    APPENDECTOMY      AS KNEE SCOPE,MED/LAT MENISCUS REPAIR Right     ORIF WRIST FRACTURE Left     ORTHOPEDIC SURGERY      VASECTOMY       Review of Systems  Constitutional, HEENT, cardiovascular, pulmonary, gi and gu systems are negative, except as otherwise noted.     Objective    Exam  /74 (BP Location: Left arm, Patient Position: Sitting, Cuff Size: Adult Regular)   Pulse 84   Temp 97.9  F (36.6  C) (Oral)   Resp 16   Ht 1.81 m (5' 11.25\")   Wt 89.4 kg (197 lb 3.2 oz)   SpO2 96%   BMI 27.31 kg/m     Estimated body mass index is 27.31 kg/m  as calculated from the following:    Height as of this encounter: 1.81 m (5' 11.25\").    Weight as of this encounter: 89.4 kg (197 lb 3.2 oz).    Physical Exam  GENERAL: alert and no distress  EYES: Eyes grossly normal to inspection, PERRL and conjunctivae and sclerae normal  HENT: ear canals and TM's normal, nose and mouth without ulcers or lesions  NECK: no adenopathy, no asymmetry, masses, or scars  RESP: lungs clear to auscultation - no " rales, rhonchi or wheezes  CV: regular rate and rhythm, normal S1 S2, no S3 or S4, no murmur, click or rub, no peripheral edema  ABDOMEN: soft, nontender, no hepatosplenomegaly, no masses and bowel sounds normal  MS: no gross musculoskeletal defects noted, no edema  SKIN: no suspicious lesions or rashes  NEURO: Normal strength and tone, mentation intact and speech normal  PSYCH: mentation appears normal, affect normal/bright    Signed Electronically by: Rakesh Rice NP

## 2024-10-08 NOTE — PATIENT INSTRUCTIONS
"For the cough, lets try over-the-counter omeprazole.  Take daily for a month and see if that helps.  It may take several days to kick in.  If it does help, then let me know    If it does not help, then try over-the-counter fluticasone/Flonase nasal spray.  Do 1 squirt up each nostril daily.  If this does not help in a month, let me know.    I suspect arthritic issues are causing the toe pain.  We will get x-rays to assess for other issues like bone spurs.    COVID shot given today.    That shingles vaccine we talked about is called \"Shingrix\".     For those with commercial insurance, check with your insurance to see if they cover it. If they do and you're interested in getting it, let me know and we can have you come in for just the shot without having to see me.    Patient Education   Preventive Care Advice   This is general advice given by our system to help you stay healthy. However, your care team may have specific advice just for you. Please talk to your care team about your preventive care needs.  Nutrition  Eat 5 or more servings of fruits and vegetables each day.  Try wheat bread, brown rice and whole grain pasta (instead of white bread, rice, and pasta).  Get enough calcium and vitamin D. Check the label on foods and aim for 100% of the RDA (recommended daily allowance).  Lifestyle  Exercise at least 150 minutes each week  (30 minutes a day, 5 days a week).  Do muscle strengthening activities 2 days a week. These help control your weight and prevent disease.  No smoking.  Wear sunscreen to prevent skin cancer.  Have a dental exam and cleaning every 6 months.  Yearly exams  See your health care team every year to talk about:  Any changes in your health.  Any medicines your care team has prescribed.  Preventive care, family planning, and ways to prevent chronic diseases.  Shots (vaccines)   HPV shots (up to age 26), if you've never had them before.  Hepatitis B shots (up to age 59), if you've never had them " before.  COVID-19 shot: Get this shot when it's due.  Flu shot: Get a flu shot every year.  Tetanus shot: Get a tetanus shot every 10 years.  Pneumococcal, hepatitis A, and RSV shots: Ask your care team if you need these based on your risk.  Shingles shot (for age 50 and up)  General health tests  Diabetes screening:  Starting at age 35, Get screened for diabetes at least every 3 years.  If you are younger than age 35, ask your care team if you should be screened for diabetes.  Cholesterol test: At age 39, start having a cholesterol test every 5 years, or more often if advised.  Bone density scan (DEXA): At age 50, ask your care team if you should have this scan for osteoporosis (brittle bones).  Hepatitis C: Get tested at least once in your life.  STIs (sexually transmitted infections)  Before age 24: Ask your care team if you should be screened for STIs.  After age 24: Get screened for STIs if you're at risk. You are at risk for STIs (including HIV) if:  You are sexually active with more than one person.  You don't use condoms every time.  You or a partner was diagnosed with a sexually transmitted infection.  If you are at risk for HIV, ask about PrEP medicine to prevent HIV.  Get tested for HIV at least once in your life, whether you are at risk for HIV or not.  Cancer screening tests  Cervical cancer screening: If you have a cervix, begin getting regular cervical cancer screening tests starting at age 21.  Breast cancer scan (mammogram): If you've ever had breasts, begin having regular mammograms starting at age 40. This is a scan to check for breast cancer.  Colon cancer screening: It is important to start screening for colon cancer at age 45.  Have a colonoscopy test every 10 years (or more often if you're at risk) Or, ask your provider about stool tests like a FIT test every year or Cologuard test every 3 years.  To learn more about your testing options, visit:   .  For help making a decision, visit:    [email or web address]/[email or web address].  Prostate cancer screening test: If you have a prostate, ask your care team if a prostate cancer screening test (PSA) at age 55 is right for you.  Lung cancer screening: If you are a current or former smoker ages 50 to 80, ask your care team if ongoing lung cancer screenings are right for you.  For informational purposes only. Not to replace the advice of your health care provider. Copyright   2023 [facility]. All rights reserved. Clinically reviewed by the [facility]. [employer] [id number] - REV 01/24.

## 2025-02-12 ENCOUNTER — TRANSFERRED RECORDS (OUTPATIENT)
Dept: HEALTH INFORMATION MANAGEMENT | Facility: CLINIC | Age: 65
End: 2025-02-12
Payer: COMMERCIAL

## 2025-02-28 ENCOUNTER — TRANSFERRED RECORDS (OUTPATIENT)
Dept: HEALTH INFORMATION MANAGEMENT | Facility: CLINIC | Age: 65
End: 2025-02-28
Payer: COMMERCIAL